# Patient Record
Sex: FEMALE | Race: WHITE | NOT HISPANIC OR LATINO | Employment: OTHER | URBAN - METROPOLITAN AREA
[De-identification: names, ages, dates, MRNs, and addresses within clinical notes are randomized per-mention and may not be internally consistent; named-entity substitution may affect disease eponyms.]

---

## 2019-03-12 ENCOUNTER — HOSPITAL ENCOUNTER (OUTPATIENT)
Dept: OTHER | Facility: HOSPITAL | Age: 39
Discharge: HOME | End: 2019-03-12
Attending: NURSE PRACTITIONER

## 2019-03-12 LAB
APPEARANCE UR: CLEAR
BILIRUB UR QL: NEGATIVE
COLOR UR: YELLOW
CONV COLLECTION SOURCE (UA): NORMAL
CONV UROBILINOGEN IN URINE BY AUTOMATED TEST STRIP: 0.2 {EHRLICHU}/DL (ref 0.1–1)
GLUCOSE UR QL: NEGATIVE MG/DL
HGB UR QL STRIP: NEGATIVE
KETONES UR QL STRIP: NEGATIVE MG/DL
LEUKOCYTE ESTERASE UR QL STRIP: NEGATIVE
NITRITE UR QL STRIP: NEGATIVE
PH UR STRIP.AUTO: 7.5 [PH] (ref 5–8)
PROT UR QL: NEGATIVE MG/DL
SP GR UR: 1.01 (ref 1–1.03)

## 2019-03-14 LAB — BACTERIA UR CULT: NORMAL

## 2019-04-03 ENCOUNTER — HOSPITAL ENCOUNTER (OUTPATIENT)
Dept: NEUROLOGY | Facility: HOSPITAL | Age: 39
Discharge: HOME | End: 2019-04-03
Attending: PSYCHIATRY & NEUROLOGY

## 2019-08-23 ENCOUNTER — HOSPITAL ENCOUNTER (OUTPATIENT)
Dept: GENERAL RADIOLOGY | Facility: HOSPITAL | Age: 39
Discharge: HOME | End: 2019-08-23
Attending: OBSTETRICS & GYNECOLOGY

## 2019-09-06 ENCOUNTER — HOSPITAL ENCOUNTER (OUTPATIENT)
Dept: GENERAL RADIOLOGY | Facility: HOSPITAL | Age: 39
Discharge: HOME | End: 2019-09-06
Attending: OBSTETRICS & GYNECOLOGY

## 2020-02-05 ENCOUNTER — HOSPITAL ENCOUNTER (OUTPATIENT)
Dept: URGENT CARE | Facility: CLINIC | Age: 40
Discharge: HOME | End: 2020-02-05
Attending: NURSE PRACTITIONER

## 2020-02-07 LAB — BACTERIA SPEC AEROBE CULT: NORMAL

## 2020-05-28 ENCOUNTER — HOSPITAL ENCOUNTER (OUTPATIENT)
Dept: OTHER | Facility: HOSPITAL | Age: 40
Discharge: HOME | End: 2020-05-28
Attending: NURSE PRACTITIONER

## 2020-05-28 LAB
APPEARANCE UR: CLEAR
BILIRUB UR QL: NEGATIVE
COLOR UR: ABNORMAL
CONV BACTERIA: ABNORMAL
CONV COLLECTION SOURCE (UA): ABNORMAL
CONV HYALINE CASTS IN URINE MICRO: ABNORMAL /[LPF]
CONV UROBILINOGEN IN URINE BY AUTOMATED TEST STRIP: 0.2 {EHRLICHU}/DL (ref 0.1–1)
GLUCOSE UR QL: NEGATIVE MG/DL
HGB UR QL STRIP: ABNORMAL
KETONES UR QL STRIP: NEGATIVE MG/DL
LEUKOCYTE ESTERASE UR QL STRIP: ABNORMAL
NITRITE UR QL STRIP: NEGATIVE
PH UR STRIP.AUTO: 7.5 [PH] (ref 5–8)
PROT UR QL: 100 MG/DL
RBC #/AREA URNS HPF: ABNORMAL /[HPF]
SP GR UR: 1 (ref 1–1.03)
SQUAMOUS SPT QL MICRO: ABNORMAL /[HPF]
WBC #/AREA URNS HPF: ABNORMAL /[HPF]

## 2020-05-31 LAB
AMOXICILLIN+CLAV SUSC ISLT: 4
AMPICILLIN SUSC ISLT: >=32
AMPICILLIN+SULBAC SUSC ISLT: 4
BACTERIA UR CULT: ABNORMAL
CEFAZOLIN SUSC ISLT: <=4
CEFEPIME SUSC ISLT: <=1
CEFTAZIDIME SUSC ISLT: <=1
CEFTRIAXONE SUSC ISLT: <=1
CEFUROXIME ORAL SUSC ISLT: 4
CEFUROXIME PARENTER SUSC ISLT: 4
CIPROFLOXACIN SUSC ISLT: <=0.25
ERTAPENEM SUSC ISLT: <=0.5
GENTAMICIN SUSC ISLT: <=1
LEVOFLOXACIN SUSC ISLT: <=0.12
NITROFURANTOIN SUSC ISLT: <=16
TETRACYCLINE SUSC ISLT: <=1
TMP SMX SUSC ISLT: >=320
TOBRAMYCIN SUSC ISLT: <=1

## 2020-09-28 ENCOUNTER — HOSPITAL ENCOUNTER (OUTPATIENT)
Dept: OTHER | Facility: HOSPITAL | Age: 40
Discharge: HOME | End: 2020-09-28
Attending: NURSE PRACTITIONER

## 2020-09-28 LAB
APPEARANCE UR: CLEAR
BILIRUB UR QL: NEGATIVE
COLOR UR: YELLOW
CONV COLLECTION SOURCE (UA): NORMAL
CONV UROBILINOGEN IN URINE BY AUTOMATED TEST STRIP: 0.2 {EHRLICHU}/DL (ref 0.1–1)
GLUCOSE UR QL: NEGATIVE MG/DL
HGB UR QL STRIP: NEGATIVE
KETONES UR QL STRIP: NEGATIVE MG/DL
LEUKOCYTE ESTERASE UR QL STRIP: NEGATIVE
NITRITE UR QL STRIP: NEGATIVE
PH UR STRIP.AUTO: 6.5 [PH] (ref 5–8)
PROT UR QL: NEGATIVE MG/DL
SP GR UR: 1.01 (ref 1–1.03)

## 2020-09-30 LAB — BACTERIA UR CULT: NORMAL

## 2020-10-08 ENCOUNTER — HOSPITAL ENCOUNTER (OUTPATIENT)
Dept: PREADMISSION TESTING | Facility: HOSPITAL | Age: 40
Discharge: HOME | End: 2020-10-08
Attending: OBSTETRICS & GYNECOLOGY

## 2020-10-09 LAB — SARS-COV-2 RNA SPEC QL NAA+PROBE: NOT DETECTED

## 2020-10-13 ENCOUNTER — HOSPITAL ENCOUNTER (OUTPATIENT)
Dept: PERIOP | Facility: HOSPITAL | Age: 40
Setting detail: HOSPITAL OUTPATIENT SURGERY
Discharge: HOME | End: 2020-10-14
Attending: OBSTETRICS & GYNECOLOGY

## 2020-10-13 LAB
ABO GROUP BLD: NORMAL
ANION GAP SERPL CALC-SCNC: 14 MMOL/L (ref 8–19)
BASOPHILS # BLD AUTO: 0.06 10*3/UL (ref 0–0.2)
BASOPHILS NFR BLD AUTO: 1.1 % (ref 0–3)
BLD GP AB SCN SERPL QL: NORMAL
BUN SERPL-MCNC: 9 MG/DL (ref 5–25)
BUN/CREAT SERPL: 8 {RATIO} (ref 6–20)
CALCIUM SERPL-MCNC: 9.1 MG/DL (ref 8.7–10.4)
CHLORIDE SERPL-SCNC: 108 MMOL/L (ref 99–111)
CONV ABD CONTROL: NORMAL
CONV ABS IMM GRAN: 0.01 10*3/UL (ref 0–0.2)
CONV CO2: 21 MMOL/L (ref 22–32)
CONV IMMATURE GRAN: 0.2 % (ref 0–1.8)
CREAT UR-MCNC: 1.14 MG/DL (ref 0.5–0.9)
DEPRECATED RDW RBC AUTO: 41.1 FL (ref 36.4–46.3)
EOSINOPHIL # BLD AUTO: 0.14 10*3/UL (ref 0–0.7)
EOSINOPHIL # BLD AUTO: 2.6 % (ref 0–7)
ERYTHROCYTE [DISTWIDTH] IN BLOOD BY AUTOMATED COUNT: 12.1 % (ref 11.7–14.4)
GFR SERPLBLD BASED ON 1.73 SQ M-ARVRAT: 59 ML/MIN/{1.73_M2}
GLUCOSE SERPL-MCNC: 81 MG/DL (ref 65–99)
HCG UR QL: NEGATIVE
HCT VFR BLD AUTO: 33.3 % (ref 37–47)
HCT VFR BLD AUTO: 35.7 % (ref 37–47)
HGB BLD-MCNC: 11.4 G/DL (ref 12–16)
HGB BLD-MCNC: 12 G/DL (ref 12–16)
LYMPHOCYTES # BLD AUTO: 2.29 10*3/UL (ref 1–5)
LYMPHOCYTES NFR BLD AUTO: 41.7 % (ref 20–45)
MCH RBC QN AUTO: 31.3 PG (ref 27–31)
MCHC RBC AUTO-ENTMCNC: 33.6 G/DL (ref 33–37)
MCV RBC AUTO: 93 FL (ref 81–99)
MONOCYTES # BLD AUTO: 0.38 10*3/UL (ref 0.2–1.2)
MONOCYTES NFR BLD AUTO: 6.9 % (ref 3–10)
NEUTROPHILS # BLD AUTO: 2.61 10*3/UL (ref 2–8)
NEUTROPHILS NFR BLD AUTO: 47.5 % (ref 30–85)
NRBC CBCN: 0 % (ref 0–0.7)
OSMOLALITY SERPL CALC.SUM OF ELEC: 286 MOSM/KG (ref 273–304)
PLATELET # BLD AUTO: 200 10*3/UL (ref 130–400)
PMV BLD AUTO: 9.2 FL (ref 9.4–12.3)
POTASSIUM SERPL-SCNC: 4 MMOL/L (ref 3.5–5.3)
RBC # BLD AUTO: 3.84 10*6/UL (ref 4.2–5.4)
RH BLD: NORMAL
SODIUM SERPL-SCNC: 139 MMOL/L (ref 135–147)
WBC # BLD AUTO: 5.49 10*3/UL (ref 4.8–10.8)

## 2020-10-14 LAB
ANION GAP SERPL CALC-SCNC: 13 MMOL/L (ref 8–19)
BASOPHILS # BLD AUTO: 0.03 10*3/UL (ref 0–0.2)
BASOPHILS NFR BLD AUTO: 0.3 % (ref 0–3)
BUN SERPL-MCNC: 6 MG/DL (ref 5–25)
BUN/CREAT SERPL: 7 {RATIO} (ref 6–20)
CALCIUM SERPL-MCNC: 7.9 MG/DL (ref 8.7–10.4)
CHLORIDE SERPL-SCNC: 108 MMOL/L (ref 99–111)
CONV ABS IMM GRAN: 0.03 10*3/UL (ref 0–0.2)
CONV CO2: 20 MMOL/L (ref 22–32)
CONV IMMATURE GRAN: 0.3 % (ref 0–1.8)
CREAT UR-MCNC: 0.83 MG/DL (ref 0.5–0.9)
DEPRECATED RDW RBC AUTO: 38.8 FL (ref 36.4–46.3)
EOSINOPHIL # BLD AUTO: 0.02 10*3/UL (ref 0–0.7)
EOSINOPHIL # BLD AUTO: 0.2 % (ref 0–7)
ERYTHROCYTE [DISTWIDTH] IN BLOOD BY AUTOMATED COUNT: 11.8 % (ref 11.7–14.4)
GFR SERPLBLD BASED ON 1.73 SQ M-ARVRAT: >60 ML/MIN/{1.73_M2}
GLUCOSE SERPL-MCNC: 118 MG/DL (ref 65–99)
HCT VFR BLD AUTO: 29.7 % (ref 37–47)
HGB BLD-MCNC: 10.3 G/DL (ref 12–16)
LYMPHOCYTES # BLD AUTO: 2.17 10*3/UL (ref 1–5)
LYMPHOCYTES NFR BLD AUTO: 21.5 % (ref 20–45)
MCH RBC QN AUTO: 31.5 PG (ref 27–31)
MCHC RBC AUTO-ENTMCNC: 34.7 G/DL (ref 33–37)
MCV RBC AUTO: 90.8 FL (ref 81–99)
MONOCYTES # BLD AUTO: 0.66 10*3/UL (ref 0.2–1.2)
MONOCYTES NFR BLD AUTO: 6.5 % (ref 3–10)
NEUTROPHILS # BLD AUTO: 7.18 10*3/UL (ref 2–8)
NEUTROPHILS NFR BLD AUTO: 71.2 % (ref 30–85)
NRBC CBCN: 0 % (ref 0–0.7)
OSMOLALITY SERPL CALC.SUM OF ELEC: 283 MOSM/KG (ref 273–304)
PLATELET # BLD AUTO: 210 10*3/UL (ref 130–400)
PMV BLD AUTO: 9.4 FL (ref 9.4–12.3)
POTASSIUM SERPL-SCNC: 3.9 MMOL/L (ref 3.5–5.3)
RBC # BLD AUTO: 3.27 10*6/UL (ref 4.2–5.4)
SODIUM SERPL-SCNC: 137 MMOL/L (ref 135–147)
WBC # BLD AUTO: 10.09 10*3/UL (ref 4.8–10.8)

## 2020-12-03 ENCOUNTER — HOSPITAL ENCOUNTER (OUTPATIENT)
Dept: GENERAL RADIOLOGY | Facility: HOSPITAL | Age: 40
Discharge: HOME | End: 2020-12-03
Attending: OBSTETRICS & GYNECOLOGY

## 2021-01-11 ENCOUNTER — HOSPITAL ENCOUNTER (OUTPATIENT)
Dept: OTHER | Facility: HOSPITAL | Age: 41
Discharge: HOME | End: 2021-01-11
Attending: NURSE PRACTITIONER

## 2021-01-11 LAB
APPEARANCE UR: CLEAR
BILIRUB UR QL: NEGATIVE
COLOR UR: YELLOW
CONV COLLECTION SOURCE (UA): NORMAL
CONV UROBILINOGEN IN URINE BY AUTOMATED TEST STRIP: 0.2 {EHRLICHU}/DL (ref 0.1–1)
GLUCOSE UR QL: NEGATIVE MG/DL
HGB UR QL STRIP: NEGATIVE
KETONES UR QL STRIP: NEGATIVE MG/DL
LEUKOCYTE ESTERASE UR QL STRIP: NEGATIVE
NITRITE UR QL STRIP: NEGATIVE
PH UR STRIP.AUTO: 6 [PH] (ref 5–8)
PROT UR QL: NEGATIVE MG/DL
SP GR UR: 1.01 (ref 1–1.03)

## 2021-01-13 LAB — BACTERIA UR CULT: NORMAL

## 2021-02-08 ENCOUNTER — OFFICE VISIT CONVERTED (OUTPATIENT)
Dept: CARDIOLOGY | Facility: CLINIC | Age: 41
End: 2021-02-08
Attending: INTERNAL MEDICINE

## 2021-02-26 ENCOUNTER — HOSPITAL ENCOUNTER (OUTPATIENT)
Dept: CARDIOLOGY | Facility: HOSPITAL | Age: 41
Discharge: HOME | End: 2021-02-26
Attending: INTERNAL MEDICINE

## 2021-05-10 NOTE — H&P
History and Physical      Patient Name: Maddy Garcia   Patient ID: [id number]   Sex: Female   YOB: 1980    Primary Care Provider: Hortensia ESPARZA   Referring Provider: Hortensia ESPARZA    Visit Date: February 8, 2021    Provider: Mat Juarez MD   Location: [facility]   Location Address: [address] [place] [address], [facility] [[email or web address]] A   [place], [place]  [id number]   Location Phone: [phone]          Chief Complaint     Chest pain.       History Of Present Illness  Consult requested by: Hortensia ESPARZA   Maddy Garcia is a 41 year old /White female with hypertension and ongoing anxiety issues who has had longstanding chest pain issues but recently worse in the last few weeks. This occurred after an argument with her , which was quite intense, associated with a sharp substernal stabbing discomfort that was associated with shortness of breath. It caused her to take some aspirin and was severe in nature. The patient reports a syncopal episode that also occurred about a year ago. She suffers from problems with anxiety and attributes a lot of that to being exacerbated by stressful events regarding her  and she reports that he is quite difficult to deal with and can be detrimental to her health in general.   PAST MEDICAL HISTORY: Hypertension; Anxiety; Posttraumatic stress disorder; Migraine headaches; Previous suicide attempts.   FAMILY MEDICAL HISTORY: Significant for brother that had MI at 40. Dad had COPD and hypertension.   PSYCHOSOCIAL HISTORY: She does not smoke but does currently vape. She does not use alcohol. Caffeine intake dailiy.   CURRENT MEDICATIONS: Carvedilol 12.5 mg b.i.d.; quetiapine 50 mg q.h.s.; lamotrigine 100 mg b.i.d.; amitriptyline 100 mg daily; prazosin 1 mg q.h.s.; Vyvanse 50 mg q.a.m.; lorazepam 2 mg t.i.d.; spironolactone 100 mg daily; buspirone 15 mg t.i.d.; chlorpromazine 500 mg 1/2 tab q.h.s.; desvenlafaxine ER 25 mg q.a.m.; Trokendi  "50 mg three tabs q.h.s.; oxycodone/acetaminophen 7.5/325 mg p.r.n.; Motrin 800 mg p.r.n.; butalbital/acetaminophen/caffeine p.r.n.; sumatriptan succinate 100 mg p.r.n.; stool softener. The dosage and frequency of the medications were reviewed with the patient.   ALLERGIES: SULFA DRUGS.       Review of Systems  · Constitutional  o Admits  o : fatigue, recent weight changes   o Denies  o : good general health lately  · Eyes  o Admits  o : blurred or double vision  · HENT  o Denies  o : hearing loss or ringing, chronic sinus problem, swollen glands in neck  · Cardiovascular  o Admits  o : chest pain, palpitations (fast, fluttering, or skipping beats), swelling (feet, ankles, hands), shortness of breath while walking or lying flat  · Respiratory  o Admits  o : asthma or wheezing  o Denies  o : COPD  · Gastrointestinal  o Denies  o : ulcers, nausea or vomiting  · Neurologic  o Admits  o : lightheaded or dizzy, headaches  o Denies  o : stroke  · Musculoskeletal  o Admits  o : joint pain, back pain  · Endocrine  o Admits  o : heat or cold intolerance, excessive thirst or urination  o Denies  o : thyroid disease, diabetes  · Heme-Lymph  o Denies  o : bleeding or bruising tendency, anemia      Vitals  Date Time BP Position Site L\R Cuff Size HR RR TEMP (F) WT  HT  BMI kg/m2 BSA m2 O2 Sat FR L/min FiO2        02/08/2021 11:37 /70 Sitting    76 - R   132lbs 0oz 5'  3\" 23.38 1.63             Physical Examination  · Constitutional  o Appearance  o : Awake, alert, in no acute distress.   · Head and Face  o HEENT  o : PERRLA.  · Eyes  o Conjunctivae  o : Normal.  · Ears, Nose, Mouth and Throat  o Oral Cavity  o :   § Oral Mucosa  § : Normal.  · Neck  o Inspection/Palpation  o : No JVD.  · Respiratory  o Respiratory  o : Chest is symmetrical. Lung fields are clear. No rhonchi or wheezes heard.  · Cardiovascular  o Heart  o :   § Auscultation of Heart  § : S1, S2 are normal. Regular rate and rhythm without murmurs, gallops, " or rubs.  o Peripheral Vascular System  o :   § Extremities  § : Nails normal. No clubbing or cyanosis. Femoral pulses adequate. Pedal pulses adequate. No peripheral edema.  · Gastrointestinal  o Abdominal Examination  o : Abdomen soft. No masses. No guarding or rigidity. No hepatosplenomegaly. Bowel sounds normal.  · Musculoskeletal  o General  o :   § General Musculoskeletal  § : Muscle tone and strength were normal.  · Skin and Subcutaneous Tissue  o General Inspection  o : Unremarkable.  · Labs  o Labs  o : Hemoglobin 12.5. Creatinine 1.0. LDL cholesterol 97.     EKG: Performed in the office today.   Indications: Chest pain.  Results: Normal sinus rhythm, borderline low voltage.  Comparison: No change from prior EKGs.    The patient had an exercise stress echocardiogram done in October 2019.  She walked for 10 minutes and stopped secondary to fatigue.  She had no clinical EKG or imaging evidence of ischemia.  At that time her ejection fraction was normal.           Assessment     ASSESSMENT & PLAN:    1.  Chest pain, associated with emotionally stressful situations.  Suspect likely just anxiety related.  The patient        does have some risk factors of hypertension and previous smoking history.  Recommended since the recent        episode was more intense to repeat a stress echocardiogram.  If this is within normal limits then would just        continue to work on the patient's anxiety issues and stressful situations.   2.  Smoking.  The patient is currently vaping.  Did discuss with her the importance of total cessation as the        long-term health outcomes of vaping are not known but certainly may be deleterious to her health in the        future.   3.  Anxiety.  Patient with previous anxiety and PTSD.  It was discussed with the patient the safety of her living        situation and she was counseled on, if needed, assistance or a safe place to live.  She denied this at this        time.  Recommended that  she follow back up with her PCP and psychiatrist to further help with her ongoing        anxiety issues.       aMt Juarez MD  JH/rt                Electronically Signed by: Serena Strange-, Other -Author on February 21, 2021 04:52:53 PM  Electronically Co-signed by: Mat Juarez MD -Reviewer on February 26, 2021 10:52:27 AM

## 2021-05-14 VITALS
SYSTOLIC BLOOD PRESSURE: 128 MMHG | HEIGHT: 63 IN | WEIGHT: 132 LBS | BODY MASS INDEX: 23.39 KG/M2 | DIASTOLIC BLOOD PRESSURE: 70 MMHG | HEART RATE: 76 BPM

## 2021-07-12 ENCOUNTER — LAB REQUISITION (OUTPATIENT)
Dept: LAB | Facility: HOSPITAL | Age: 41
End: 2021-07-12

## 2021-07-12 DIAGNOSIS — R10.2 PELVIC AND PERINEAL PAIN: ICD-10-CM

## 2021-07-12 LAB
BACTERIA UR QL AUTO: ABNORMAL /HPF
BILIRUB UR QL STRIP: NEGATIVE
CLARITY UR: CLEAR
COLOR UR: YELLOW
GLUCOSE UR STRIP-MCNC: NEGATIVE MG/DL
HGB UR QL STRIP.AUTO: NEGATIVE
HYALINE CASTS UR QL AUTO: ABNORMAL /LPF
KETONES UR QL STRIP: NEGATIVE
LEUKOCYTE ESTERASE UR QL STRIP.AUTO: ABNORMAL
NITRITE UR QL STRIP: NEGATIVE
PH UR STRIP.AUTO: 8 [PH] (ref 5–8)
PROT UR QL STRIP: NEGATIVE
RBC # UR: ABNORMAL /HPF
REF LAB TEST METHOD: ABNORMAL
SP GR UR STRIP: <=1.005 (ref 1–1.03)
SQUAMOUS #/AREA URNS HPF: ABNORMAL /HPF
UROBILINOGEN UR QL STRIP: ABNORMAL
WBC UR QL AUTO: ABNORMAL /HPF

## 2021-07-12 PROCEDURE — 81001 URINALYSIS AUTO W/SCOPE: CPT | Performed by: NURSE PRACTITIONER

## 2021-07-12 PROCEDURE — 87186 SC STD MICRODIL/AGAR DIL: CPT | Performed by: NURSE PRACTITIONER

## 2021-07-12 PROCEDURE — 87077 CULTURE AEROBIC IDENTIFY: CPT | Performed by: NURSE PRACTITIONER

## 2021-07-12 PROCEDURE — 87086 URINE CULTURE/COLONY COUNT: CPT | Performed by: NURSE PRACTITIONER

## 2021-07-14 LAB — BACTERIA SPEC AEROBE CULT: ABNORMAL

## 2021-07-28 LAB
BILIRUB UR QL STRIP: NEGATIVE
CLARITY UR: CLEAR
COLOR UR: YELLOW
GLUCOSE UR STRIP-MCNC: NEGATIVE MG/DL
HGB UR QL STRIP.AUTO: NEGATIVE
KETONES UR QL STRIP: NEGATIVE
LEUKOCYTE ESTERASE UR QL STRIP.AUTO: NEGATIVE
NITRITE UR QL STRIP: NEGATIVE
PH UR STRIP.AUTO: 7.5 [PH] (ref 5–8)
PROT UR QL STRIP: NEGATIVE
SP GR UR STRIP: <=1.005 (ref 1–1.03)
UROBILINOGEN UR QL STRIP: NORMAL

## 2021-07-28 PROCEDURE — 96374 THER/PROPH/DIAG INJ IV PUSH: CPT

## 2021-07-28 PROCEDURE — 81003 URINALYSIS AUTO W/O SCOPE: CPT | Performed by: EMERGENCY MEDICINE

## 2021-07-28 PROCEDURE — 99283 EMERGENCY DEPT VISIT LOW MDM: CPT

## 2021-07-28 RX ORDER — SODIUM CHLORIDE 0.9 % (FLUSH) 0.9 %
10 SYRINGE (ML) INJECTION AS NEEDED
Status: DISCONTINUED | OUTPATIENT
Start: 2021-07-28 | End: 2021-07-29 | Stop reason: HOSPADM

## 2021-07-29 ENCOUNTER — APPOINTMENT (OUTPATIENT)
Dept: CT IMAGING | Facility: HOSPITAL | Age: 41
End: 2021-07-29

## 2021-07-29 ENCOUNTER — HOSPITAL ENCOUNTER (EMERGENCY)
Facility: HOSPITAL | Age: 41
Discharge: HOME | End: 2021-07-29
Attending: EMERGENCY MEDICINE | Admitting: EMERGENCY MEDICINE

## 2021-07-29 VITALS
WEIGHT: 128.75 LBS | HEART RATE: 62 BPM | OXYGEN SATURATION: 100 % | BODY MASS INDEX: 22.81 KG/M2 | SYSTOLIC BLOOD PRESSURE: 109 MMHG | RESPIRATION RATE: 16 BRPM | TEMPERATURE: 97.5 F | DIASTOLIC BLOOD PRESSURE: 72 MMHG

## 2021-07-29 DIAGNOSIS — R10.31 RIGHT LOWER QUADRANT ABDOMINAL PAIN: Primary | ICD-10-CM

## 2021-07-29 DIAGNOSIS — R10.9 RIGHT FLANK PAIN: ICD-10-CM

## 2021-07-29 LAB
ALBUMIN SERPL-MCNC: 4.5 G/DL (ref 3.5–5.2)
ALBUMIN/GLOB SERPL: 2 G/DL
ALP SERPL-CCNC: 46 U/L (ref 39–117)
ALT SERPL W P-5'-P-CCNC: 13 U/L (ref 1–33)
ANION GAP SERPL CALCULATED.3IONS-SCNC: 9.4 MMOL/L (ref 5–15)
AST SERPL-CCNC: 16 U/L (ref 1–32)
BASOPHILS # BLD AUTO: 0.04 10*3/MM3 (ref 0–0.2)
BASOPHILS NFR BLD AUTO: 0.6 % (ref 0–1.5)
BILIRUB SERPL-MCNC: 0.2 MG/DL (ref 0–1.2)
BUN SERPL-MCNC: 7 MG/DL (ref 6–20)
BUN/CREAT SERPL: 7.5 (ref 7–25)
CALCIUM SPEC-SCNC: 8.9 MG/DL (ref 8.6–10.5)
CHLORIDE SERPL-SCNC: 108 MMOL/L (ref 98–107)
CO2 SERPL-SCNC: 21.6 MMOL/L (ref 22–29)
CREAT SERPL-MCNC: 0.93 MG/DL (ref 0.57–1)
DEPRECATED RDW RBC AUTO: 40.3 FL (ref 37–54)
EOSINOPHIL # BLD AUTO: 0.11 10*3/MM3 (ref 0–0.4)
EOSINOPHIL NFR BLD AUTO: 1.7 % (ref 0.3–6.2)
ERYTHROCYTE [DISTWIDTH] IN BLOOD BY AUTOMATED COUNT: 12 % (ref 12.3–15.4)
GFR SERPL CREATININE-BSD FRML MDRD: 66 ML/MIN/1.73
GLOBULIN UR ELPH-MCNC: 2.3 GM/DL
GLUCOSE SERPL-MCNC: 82 MG/DL (ref 65–99)
HCT VFR BLD AUTO: 36.7 % (ref 34–46.6)
HGB BLD-MCNC: 12.6 G/DL (ref 12–15.9)
HOLD SPECIMEN: NORMAL
HOLD SPECIMEN: NORMAL
IMM GRANULOCYTES # BLD AUTO: 0.02 10*3/MM3 (ref 0–0.05)
IMM GRANULOCYTES NFR BLD AUTO: 0.3 % (ref 0–0.5)
LIPASE SERPL-CCNC: 33 U/L (ref 13–60)
LYMPHOCYTES # BLD AUTO: 2.54 10*3/MM3 (ref 0.7–3.1)
LYMPHOCYTES NFR BLD AUTO: 39.3 % (ref 19.6–45.3)
MCH RBC QN AUTO: 31.5 PG (ref 26.6–33)
MCHC RBC AUTO-ENTMCNC: 34.3 G/DL (ref 31.5–35.7)
MCV RBC AUTO: 91.8 FL (ref 79–97)
MONOCYTES # BLD AUTO: 0.4 10*3/MM3 (ref 0.1–0.9)
MONOCYTES NFR BLD AUTO: 6.2 % (ref 5–12)
NEUTROPHILS NFR BLD AUTO: 3.36 10*3/MM3 (ref 1.7–7)
NEUTROPHILS NFR BLD AUTO: 51.9 % (ref 42.7–76)
NRBC BLD AUTO-RTO: 0 /100 WBC (ref 0–0.2)
PLATELET # BLD AUTO: 191 10*3/MM3 (ref 140–450)
PMV BLD AUTO: 9 FL (ref 6–12)
POTASSIUM SERPL-SCNC: 3.8 MMOL/L (ref 3.5–5.2)
PROT SERPL-MCNC: 6.8 G/DL (ref 6–8.5)
RBC # BLD AUTO: 4 10*6/MM3 (ref 3.77–5.28)
SODIUM SERPL-SCNC: 139 MMOL/L (ref 136–145)
WBC # BLD AUTO: 6.47 10*3/MM3 (ref 3.4–10.8)
WHOLE BLOOD HOLD SPECIMEN: NORMAL

## 2021-07-29 PROCEDURE — 25010000002 MORPHINE PER 10 MG: Performed by: EMERGENCY MEDICINE

## 2021-07-29 PROCEDURE — 96374 THER/PROPH/DIAG INJ IV PUSH: CPT

## 2021-07-29 PROCEDURE — 0 IOPAMIDOL PER 1 ML: Performed by: EMERGENCY MEDICINE

## 2021-07-29 PROCEDURE — 80053 COMPREHEN METABOLIC PANEL: CPT | Performed by: EMERGENCY MEDICINE

## 2021-07-29 PROCEDURE — 85025 COMPLETE CBC W/AUTO DIFF WBC: CPT | Performed by: EMERGENCY MEDICINE

## 2021-07-29 PROCEDURE — 74177 CT ABD & PELVIS W/CONTRAST: CPT

## 2021-07-29 PROCEDURE — 83690 ASSAY OF LIPASE: CPT | Performed by: EMERGENCY MEDICINE

## 2021-07-29 RX ORDER — QUETIAPINE FUMARATE 50 MG/1
50 TABLET, FILM COATED ORAL
COMMUNITY
Start: 2021-05-30 | End: 2021-08-10 | Stop reason: HOSPADM

## 2021-07-29 RX ORDER — BUSPIRONE HYDROCHLORIDE 15 MG/1
15 TABLET ORAL 3 TIMES DAILY
Status: ON HOLD | COMMUNITY
Start: 2021-07-09 | End: 2021-08-10 | Stop reason: SDUPTHER

## 2021-07-29 RX ORDER — DESVENLAFAXINE SUCCINATE 50 MG/1
50 TABLET, EXTENDED RELEASE ORAL DAILY
COMMUNITY

## 2021-07-29 RX ORDER — LORAZEPAM 2 MG/1
2 TABLET ORAL 3 TIMES DAILY PRN
COMMUNITY
Start: 2021-06-23 | End: 2021-08-10 | Stop reason: HOSPADM

## 2021-07-29 RX ORDER — SODIUM CHLORIDE 0.9 % (FLUSH) 0.9 %
10 SYRINGE (ML) INJECTION AS NEEDED
Status: DISCONTINUED | OUTPATIENT
Start: 2021-07-29 | End: 2021-07-29 | Stop reason: HOSPADM

## 2021-07-29 RX ORDER — CARBAMAZEPINE 200 MG/1
200 TABLET ORAL 2 TIMES DAILY
COMMUNITY

## 2021-07-29 RX ORDER — AMITRIPTYLINE HYDROCHLORIDE 100 MG/1
100 TABLET, FILM COATED ORAL NIGHTLY
COMMUNITY
End: 2021-08-10 | Stop reason: HOSPADM

## 2021-07-29 RX ORDER — DICYCLOMINE HCL 20 MG
20 TABLET ORAL EVERY 6 HOURS
Qty: 20 TABLET | Refills: 0 | Status: SHIPPED | OUTPATIENT
Start: 2021-07-29 | End: 2021-08-10 | Stop reason: HOSPADM

## 2021-07-29 RX ORDER — CHLORZOXAZONE 500 MG/1
750 TABLET ORAL NIGHTLY
COMMUNITY

## 2021-07-29 RX ORDER — CARVEDILOL 25 MG/1
25 TABLET ORAL 2 TIMES DAILY WITH MEALS
COMMUNITY
Start: 2021-06-29

## 2021-07-29 RX ORDER — PRAZOSIN HYDROCHLORIDE 1 MG/1
1 CAPSULE ORAL NIGHTLY
COMMUNITY

## 2021-07-29 RX ORDER — ONDANSETRON 4 MG/1
4 TABLET, ORALLY DISINTEGRATING ORAL EVERY 8 HOURS PRN
Qty: 10 TABLET | Refills: 0 | Status: SHIPPED | OUTPATIENT
Start: 2021-07-29 | End: 2021-08-10 | Stop reason: HOSPADM

## 2021-07-29 RX ORDER — SPIRONOLACTONE 100 MG/1
100 TABLET, FILM COATED ORAL DAILY
COMMUNITY
Start: 2021-06-30 | End: 2021-08-04

## 2021-07-29 RX ORDER — LAMOTRIGINE 100 MG/1
100 TABLET ORAL 2 TIMES DAILY
COMMUNITY
Start: 2021-07-11

## 2021-07-29 RX ORDER — TOPIRAMATE 50 MG/1
3 CAPSULE, EXTENDED RELEASE ORAL DAILY
COMMUNITY
Start: 2021-07-05 | End: 2021-08-10 | Stop reason: HOSPADM

## 2021-07-29 RX ORDER — OXYCODONE AND ACETAMINOPHEN 7.5; 325 MG/1; MG/1
1 TABLET ORAL EVERY 8 HOURS PRN
COMMUNITY
Start: 2021-06-23

## 2021-07-29 RX ORDER — CEFDINIR 300 MG/1
CAPSULE ORAL
COMMUNITY
End: 2021-08-04

## 2021-07-29 RX ADMIN — MORPHINE SULFATE 4 MG: 4 INJECTION, SOLUTION INTRAMUSCULAR; INTRAVENOUS at 02:02

## 2021-07-29 RX ADMIN — IOPAMIDOL 100 ML: 755 INJECTION, SOLUTION INTRAVENOUS at 02:29

## 2021-08-04 ENCOUNTER — APPOINTMENT (OUTPATIENT)
Dept: GENERAL RADIOLOGY | Facility: HOSPITAL | Age: 41
End: 2021-08-04

## 2021-08-04 ENCOUNTER — HOSPITAL ENCOUNTER (INPATIENT)
Facility: HOSPITAL | Age: 41
LOS: 6 days | Discharge: HOME | End: 2021-08-10
Attending: EMERGENCY MEDICINE | Admitting: INTERNAL MEDICINE

## 2021-08-04 DIAGNOSIS — R40.0 SOMNOLENCE: ICD-10-CM

## 2021-08-04 DIAGNOSIS — F43.12 CHRONIC POST-TRAUMATIC STRESS DISORDER (PTSD): ICD-10-CM

## 2021-08-04 DIAGNOSIS — T50.902A INTENTIONAL OVERDOSE OF DRUG IN TABLET FORM (HCC): Primary | ICD-10-CM

## 2021-08-04 DIAGNOSIS — R13.12 OROPHARYNGEAL DYSPHAGIA: ICD-10-CM

## 2021-08-04 DIAGNOSIS — Z78.9 DECREASED ACTIVITIES OF DAILY LIVING (ADL): ICD-10-CM

## 2021-08-04 DIAGNOSIS — F19.10 SUBSTANCE ABUSE (HCC): ICD-10-CM

## 2021-08-04 LAB
ALBUMIN SERPL-MCNC: 3.9 G/DL (ref 3.5–5.2)
ALBUMIN/GLOB SERPL: 2 G/DL
ALP SERPL-CCNC: 42 U/L (ref 39–117)
ALT SERPL W P-5'-P-CCNC: 13 U/L (ref 1–33)
AMPHET+METHAMPHET UR QL: POSITIVE
ANION GAP SERPL CALCULATED.3IONS-SCNC: 7 MMOL/L (ref 5–15)
APAP SERPL-MCNC: 5.2 MCG/ML (ref 0–30)
AST SERPL-CCNC: 18 U/L (ref 1–32)
BARBITURATES UR QL SCN: NEGATIVE
BASOPHILS # BLD AUTO: 0.03 10*3/MM3 (ref 0–0.2)
BASOPHILS NFR BLD AUTO: 0.7 % (ref 0–1.5)
BENZODIAZ UR QL SCN: POSITIVE
BILIRUB SERPL-MCNC: 0.3 MG/DL (ref 0–1.2)
BUN SERPL-MCNC: 10 MG/DL (ref 6–20)
BUN/CREAT SERPL: 9.3 (ref 7–25)
CALCIUM SPEC-SCNC: 8.4 MG/DL (ref 8.6–10.5)
CANNABINOIDS SERPL QL: POSITIVE
CHLORIDE SERPL-SCNC: 105 MMOL/L (ref 98–107)
CO2 SERPL-SCNC: 23 MMOL/L (ref 22–29)
COCAINE UR QL: NEGATIVE
CREAT SERPL-MCNC: 1.07 MG/DL (ref 0.57–1)
DEPRECATED RDW RBC AUTO: 39.7 FL (ref 37–54)
EOSINOPHIL # BLD AUTO: 0.1 10*3/MM3 (ref 0–0.4)
EOSINOPHIL NFR BLD AUTO: 2.2 % (ref 0.3–6.2)
ERYTHROCYTE [DISTWIDTH] IN BLOOD BY AUTOMATED COUNT: 11.9 % (ref 12.3–15.4)
ETHANOL BLD-MCNC: <10 MG/DL (ref 0–10)
ETHANOL UR QL: <0.01 %
GFR SERPL CREATININE-BSD FRML MDRD: 56 ML/MIN/1.73
GLOBULIN UR ELPH-MCNC: 2 GM/DL
GLUCOSE SERPL-MCNC: 90 MG/DL (ref 65–99)
HCT VFR BLD AUTO: 35.9 % (ref 34–46.6)
HGB BLD-MCNC: 12.4 G/DL (ref 12–15.9)
HOLD SPECIMEN: NORMAL
HOLD SPECIMEN: NORMAL
IMM GRANULOCYTES # BLD AUTO: 0.01 10*3/MM3 (ref 0–0.05)
IMM GRANULOCYTES NFR BLD AUTO: 0.2 % (ref 0–0.5)
LYMPHOCYTES # BLD AUTO: 2.19 10*3/MM3 (ref 0.7–3.1)
LYMPHOCYTES NFR BLD AUTO: 48.6 % (ref 19.6–45.3)
MCH RBC QN AUTO: 31.7 PG (ref 26.6–33)
MCHC RBC AUTO-ENTMCNC: 34.5 G/DL (ref 31.5–35.7)
MCV RBC AUTO: 91.8 FL (ref 79–97)
METHADONE UR QL SCN: NEGATIVE
MONOCYTES # BLD AUTO: 0.32 10*3/MM3 (ref 0.1–0.9)
MONOCYTES NFR BLD AUTO: 7.1 % (ref 5–12)
NEUTROPHILS NFR BLD AUTO: 1.86 10*3/MM3 (ref 1.7–7)
NEUTROPHILS NFR BLD AUTO: 41.2 % (ref 42.7–76)
NRBC BLD AUTO-RTO: 0 /100 WBC (ref 0–0.2)
OPIATES UR QL: NEGATIVE
OXYCODONE UR QL SCN: POSITIVE
PLATELET # BLD AUTO: 187 10*3/MM3 (ref 140–450)
PMV BLD AUTO: 8.9 FL (ref 6–12)
POTASSIUM SERPL-SCNC: 3.4 MMOL/L (ref 3.5–5.2)
PROT SERPL-MCNC: 5.9 G/DL (ref 6–8.5)
RBC # BLD AUTO: 3.91 10*6/MM3 (ref 3.77–5.28)
SALICYLATES SERPL-MCNC: 0.4 MG/DL
SODIUM SERPL-SCNC: 135 MMOL/L (ref 136–145)
WBC # BLD AUTO: 4.51 10*3/MM3 (ref 3.4–10.8)
WHOLE BLOOD HOLD SPECIMEN: NORMAL

## 2021-08-04 PROCEDURE — 87205 SMEAR GRAM STAIN: CPT | Performed by: EMERGENCY MEDICINE

## 2021-08-04 PROCEDURE — 99291 CRITICAL CARE FIRST HOUR: CPT

## 2021-08-04 PROCEDURE — 93005 ELECTROCARDIOGRAM TRACING: CPT | Performed by: INTERNAL MEDICINE

## 2021-08-04 PROCEDURE — 80179 DRUG ASSAY SALICYLATE: CPT | Performed by: EMERGENCY MEDICINE

## 2021-08-04 PROCEDURE — 5A1945Z RESPIRATORY VENTILATION, 24-96 CONSECUTIVE HOURS: ICD-10-PCS | Performed by: EMERGENCY MEDICINE

## 2021-08-04 PROCEDURE — 80307 DRUG TEST PRSMV CHEM ANLYZR: CPT | Performed by: EMERGENCY MEDICINE

## 2021-08-04 PROCEDURE — 71045 X-RAY EXAM CHEST 1 VIEW: CPT

## 2021-08-04 PROCEDURE — 99291 CRITICAL CARE FIRST HOUR: CPT | Performed by: INTERNAL MEDICINE

## 2021-08-04 PROCEDURE — 0BH17EZ INSERTION OF ENDOTRACHEAL AIRWAY INTO TRACHEA, VIA NATURAL OR ARTIFICIAL OPENING: ICD-10-PCS | Performed by: EMERGENCY MEDICINE

## 2021-08-04 PROCEDURE — 94799 UNLISTED PULMONARY SVC/PX: CPT

## 2021-08-04 PROCEDURE — 80143 DRUG ASSAY ACETAMINOPHEN: CPT | Performed by: EMERGENCY MEDICINE

## 2021-08-04 PROCEDURE — 93005 ELECTROCARDIOGRAM TRACING: CPT | Performed by: EMERGENCY MEDICINE

## 2021-08-04 PROCEDURE — 82077 ASSAY SPEC XCP UR&BREATH IA: CPT | Performed by: EMERGENCY MEDICINE

## 2021-08-04 PROCEDURE — 31500 INSERT EMERGENCY AIRWAY: CPT

## 2021-08-04 PROCEDURE — 85025 COMPLETE CBC W/AUTO DIFF WBC: CPT | Performed by: EMERGENCY MEDICINE

## 2021-08-04 PROCEDURE — 80053 COMPREHEN METABOLIC PANEL: CPT | Performed by: EMERGENCY MEDICINE

## 2021-08-04 PROCEDURE — 87070 CULTURE OTHR SPECIMN AEROBIC: CPT | Performed by: EMERGENCY MEDICINE

## 2021-08-04 PROCEDURE — 25010000003 POTASSIUM CHLORIDE 10 MEQ/100ML SOLUTION: Performed by: INTERNAL MEDICINE

## 2021-08-04 PROCEDURE — 94002 VENT MGMT INPAT INIT DAY: CPT

## 2021-08-04 RX ORDER — ROCURONIUM BROMIDE 10 MG/ML
INJECTION, SOLUTION INTRAVENOUS
Status: COMPLETED | OUTPATIENT
Start: 2021-08-04 | End: 2021-08-04

## 2021-08-04 RX ORDER — ONDANSETRON 2 MG/ML
4 INJECTION INTRAMUSCULAR; INTRAVENOUS EVERY 6 HOURS PRN
Status: DISCONTINUED | OUTPATIENT
Start: 2021-08-04 | End: 2021-08-10 | Stop reason: HOSPADM

## 2021-08-04 RX ORDER — POTASSIUM CHLORIDE 7.45 MG/ML
10 INJECTION INTRAVENOUS
Status: DISPENSED | OUTPATIENT
Start: 2021-08-04 | End: 2021-08-04

## 2021-08-04 RX ORDER — ETOMIDATE 2 MG/ML
INJECTION INTRAVENOUS
Status: COMPLETED | OUTPATIENT
Start: 2021-08-04 | End: 2021-08-04

## 2021-08-04 RX ORDER — NOREPINEPHRINE BIT/0.9 % NACL 8 MG/250ML
.02-.3 INFUSION BOTTLE (ML) INTRAVENOUS
Status: DISCONTINUED | OUTPATIENT
Start: 2021-08-05 | End: 2021-08-08

## 2021-08-04 RX ORDER — BISACODYL 10 MG
10 SUPPOSITORY, RECTAL RECTAL DAILY PRN
Status: DISCONTINUED | OUTPATIENT
Start: 2021-08-04 | End: 2021-08-10 | Stop reason: HOSPADM

## 2021-08-04 RX ORDER — MULTIPLE VITAMINS W/ MINERALS TAB 9MG-400MCG
1 TAB ORAL DAILY
Status: DISCONTINUED | OUTPATIENT
Start: 2021-08-05 | End: 2021-08-10 | Stop reason: HOSPADM

## 2021-08-04 RX ORDER — SODIUM CHLORIDE 0.9 % (FLUSH) 0.9 %
10 SYRINGE (ML) INJECTION AS NEEDED
Status: DISCONTINUED | OUTPATIENT
Start: 2021-08-04 | End: 2021-08-10 | Stop reason: HOSPADM

## 2021-08-04 RX ORDER — SODIUM CHLORIDE 9 MG/ML
INJECTION, SOLUTION INTRAVENOUS
Status: COMPLETED
Start: 2021-08-04 | End: 2021-08-04

## 2021-08-04 RX ORDER — FAMOTIDINE 20 MG/1
40 TABLET, FILM COATED ORAL DAILY
Status: DISCONTINUED | OUTPATIENT
Start: 2021-08-05 | End: 2021-08-10 | Stop reason: HOSPADM

## 2021-08-04 RX ORDER — NALOXONE HYDROCHLORIDE 1 MG/ML
INJECTION INTRAMUSCULAR; INTRAVENOUS; SUBCUTANEOUS
Status: COMPLETED | OUTPATIENT
Start: 2021-08-04 | End: 2021-08-04

## 2021-08-04 RX ORDER — NOREPINEPHRINE BIT/0.9 % NACL 8 MG/250ML
INFUSION BOTTLE (ML) INTRAVENOUS
Status: DISPENSED
Start: 2021-08-04 | End: 2021-08-05

## 2021-08-04 RX ORDER — POLYETHYLENE GLYCOL 3350 17 G/17G
17 POWDER, FOR SOLUTION ORAL DAILY PRN
Status: DISCONTINUED | OUTPATIENT
Start: 2021-08-04 | End: 2021-08-10 | Stop reason: HOSPADM

## 2021-08-04 RX ORDER — NALOXONE HYDROCHLORIDE 1 MG/ML
INJECTION INTRAMUSCULAR; INTRAVENOUS; SUBCUTANEOUS
Status: COMPLETED
Start: 2021-08-04 | End: 2021-08-04

## 2021-08-04 RX ORDER — NALOXONE HYDROCHLORIDE 1 MG/ML
2 INJECTION INTRAMUSCULAR; INTRAVENOUS; SUBCUTANEOUS ONCE
Status: COMPLETED | OUTPATIENT
Start: 2021-08-04 | End: 2021-08-04

## 2021-08-04 RX ORDER — BISACODYL 5 MG/1
5 TABLET, DELAYED RELEASE ORAL DAILY PRN
Status: DISCONTINUED | OUTPATIENT
Start: 2021-08-04 | End: 2021-08-10 | Stop reason: HOSPADM

## 2021-08-04 RX ORDER — CHOLECALCIFEROL (VITAMIN D3) 125 MCG
5 CAPSULE ORAL NIGHTLY PRN
Status: DISCONTINUED | OUTPATIENT
Start: 2021-08-04 | End: 2021-08-10 | Stop reason: HOSPADM

## 2021-08-04 RX ORDER — AMOXICILLIN 250 MG
2 CAPSULE ORAL 2 TIMES DAILY
Status: DISCONTINUED | OUTPATIENT
Start: 2021-08-04 | End: 2021-08-10 | Stop reason: HOSPADM

## 2021-08-04 RX ORDER — SODIUM CHLORIDE 0.9 % (FLUSH) 0.9 %
10 SYRINGE (ML) INJECTION EVERY 12 HOURS SCHEDULED
Status: DISCONTINUED | OUTPATIENT
Start: 2021-08-04 | End: 2021-08-10 | Stop reason: HOSPADM

## 2021-08-04 RX ORDER — PROPOFOL 10 MG/ML
VIAL (ML) INTRAVENOUS
Status: DISPENSED
Start: 2021-08-04 | End: 2021-08-05

## 2021-08-04 RX ORDER — SODIUM CHLORIDE 0.9 % (FLUSH) 0.9 %
10 SYRINGE (ML) INJECTION AS NEEDED
Status: DISCONTINUED | OUTPATIENT
Start: 2021-08-04 | End: 2021-08-04

## 2021-08-04 RX ADMIN — POTASSIUM CHLORIDE 10 MEQ: 7.46 INJECTION, SOLUTION INTRAVENOUS at 22:06

## 2021-08-04 RX ADMIN — NALOXONE HYDROCHLORIDE 2 MG: 1 INJECTION PARENTERAL at 16:05

## 2021-08-04 RX ADMIN — SODIUM CHLORIDE 1000 ML: 9 INJECTION, SOLUTION INTRAVENOUS at 16:12

## 2021-08-04 RX ADMIN — POTASSIUM CHLORIDE 10 MEQ: 7.46 INJECTION, SOLUTION INTRAVENOUS at 19:37

## 2021-08-04 RX ADMIN — ROCURONIUM BROMIDE 75 MG: 10 INJECTION INTRAVENOUS at 16:43

## 2021-08-04 RX ADMIN — SODIUM CHLORIDE 1000 ML: 9 INJECTION, SOLUTION INTRAVENOUS at 16:30

## 2021-08-04 RX ADMIN — SODIUM CHLORIDE, PRESERVATIVE FREE 10 ML: 5 INJECTION INTRAVENOUS at 22:07

## 2021-08-04 RX ADMIN — POTASSIUM CHLORIDE 10 MEQ: 7.46 INJECTION, SOLUTION INTRAVENOUS at 23:34

## 2021-08-04 RX ADMIN — NALOXONE HYDROCHLORIDE 2 MG: 1 INJECTION INTRAMUSCULAR; INTRAVENOUS; SUBCUTANEOUS at 16:15

## 2021-08-04 RX ADMIN — ETOMIDATE 10 MG: 40 INJECTION, SOLUTION INTRAVENOUS at 16:43

## 2021-08-04 RX ADMIN — NALOXONE HYDROCHLORIDE 2 MG: 1 INJECTION PARENTERAL at 16:15

## 2021-08-04 NOTE — ED PROVIDER NOTES
Time: 4:04 PM EDT  Arrived by: Ambulance  Chief Complaint:   Chief Complaint   Patient presents with   • Ingestion     Unknown overdose. Family disposed of what pt took before EMS arrived. Pt is minimally responsive     History provided by: EMS  History is limited by: AMS    History of Present Illness:      Maddy Garcia is a 41 y.o. female who presents to the emergency department today with complaints of possible ingestion. EMS reports that empty Buspar (15mg) and Seroquel (50mg) bottles were discovered. Zofran and Vyvance bottles were also discovered. They note that the patient was minimally responsive at the scene. She was given Narcan prior to arrival.    Per her records, the patient has a history of anxiety, mood disorder, and GERD. She is a former smoker and does drink rarely. There are no other acute complaints at this time.      History provided by:  EMS personnel  History limited by:  Mental status change   used: No    Ingestion  Ingested substance:  Prescription medication  Time since incident: Unknown.  Ingestion amount:  Unknown amount.  Incident location:  Home  Context: substance was missing        Similar Symptoms Previously: Unknown.  Recently seen: Patient was last seen in the ED on 2021 with abdominal pain.      Patient Care Team  Primary Care Provider: Jaileen Mitchell APRN    Past Medical History:     Allergies   Allergen Reactions   • Sulfa Antibiotics Rash     Past Medical History:   Diagnosis Date   • Anxiety    • Arthritis    • Bladder disorder    • Chest pain    • Chronic allergic rhinitis    • GERD (gastroesophageal reflux disease)    • Mood disorder (CMS/[facility])      Past Surgical History:   Procedure Laterality Date   • ABDOMINAL SURGERY     •  SECTION      HAD 3: ,2006,   • HYSTERECTOMY       Family History   Problem Relation Age of Onset   • Diabetes Mother         UNSPECIFIED   • Breast cancer Mother         MOTHER IN 60s   • Diabetes  Maternal Grandmother         UNSPECIFIED   • Diabetes Paternal Grandmother         UNSPECIFIED       Home Medications:  Prior to Admission medications    Medication Sig Start Date End Date Taking? Authorizing Provider   amitriptyline (ELAVIL) 100 MG tablet amitriptyline 100 mg tablet   TAKE 1 TABLET BY MOUTH EVERY NIGHT AT BEDTIME    Librado Marcus MD   busPIRone (BUSPAR) 15 MG tablet  7/9/21   Librado Marcus MD   carBAMazepine (TEGretol) 200 MG tablet carbamazepine 200 mg tablet   TAKE 1 TABLET BY MOUTH TWICE DAILY    Librado Marcus MD   carvedilol (COREG) 25 MG tablet Take 25 mg by mouth 2 (Two) Times a Day. 6/29/21   Librado Marcus MD   cefdinir (OMNICEF) 300 MG capsule cefdinir 300 mg capsule    Librado Marcus MD   chlorzoxazone (PARAFON FORTE) 500 MG tablet chlorzoxazone 500 mg tablet   TAKE 1.5 TABLETS BY MOUTH EVERY NIGHT AT BEDTIME FOOD    Librado Marcus MD   Desvenlafaxine Succinate ER 25 MG tablet sustained-release 24 hour desvenlafaxine succinate ER 25 mg tablet,extended release 24 hr    Librado Marcus MD   dicyclomine (BENTYL) 20 MG tablet Take 1 tablet by mouth Every 6 (Six) Hours. 7/29/21   Parker Aldana APRN   lamoTRIgine (LaMICtal) 100 MG tablet  7/11/21   Librado Marcus MD   lisdexamfetamine (Vyvanse) 50 MG capsule Vyvanse 50 mg capsule   TAKE 1 CAPSULE BY MOUTH EVERY MORNING    Librado Marcus MD   LORazepam (ATIVAN) 2 MG tablet Take 2 mg by mouth 3 (Three) Times a Day As Needed. 6/23/21   Librado Marcus MD   ondansetron ODT (ZOFRAN-ODT) 4 MG disintegrating tablet Place 1 tablet on the tongue Every 8 (Eight) Hours As Needed for Nausea or Vomiting. 7/29/21   Parker Aldana APRN   oxyCODONE-acetaminophen (PERCOCET) 7.5-325 MG per tablet Take 1 tablet by mouth Every 8 (Eight) Hours As Needed. 6/23/21   Librado Marcus MD   prazosin (MINIPRESS) 1 MG capsule prazosin 1 mg capsule   TAKE 1 CAPSULE BY MOUTH EVERY  "NIGHT AT BEDTIME FOR NIGHTMARES    Librado Marcus MD   QUEtiapine (SEROquel) 50 MG tablet Take 50 mg by mouth every night at bedtime. 5/30/21   Librado Marcus MD   spironolactone (ALDACTONE) 100 MG tablet Take 100 mg by mouth Daily. 6/30/21   Librado Marcus MD   Trokendi XR 50 MG capsule sustained-release 24 hr Take 3 capsules by mouth Daily. 7/5/21   Librado Marcus MD        Social History:   Social History     Tobacco Use   • Smoking status: Former Smoker   • Smokeless tobacco: Never Used   • Tobacco comment: STARTED AGE 20; QUIT AT AGE 34   Vaping Use   • Vaping Use: Every day   • Substances: Nicotine   Substance Use Topics   • Alcohol use: Never     Comment: rare   • Drug use: Not on file     Recent travel: not applicable     Review of Systems:  Review of Systems   Unable to perform ROS: Mental status change   Neurological:        Minimal responsiveness per EMS.        Physical Exam:  /71   Pulse 52   Temp 97.8 °F (36.6 °C) (Oral)   Resp 20   Ht 170.2 cm (67\")   Wt 61.5 kg (135 lb 9.3 oz)   SpO2 100%   BMI 21.24 kg/m²     Physical Exam  Vitals and nursing note reviewed.   Constitutional:       General: She is not in acute distress.     Comments: She is lethargic and appears intoxicated.   HENT:      Head: Normocephalic and atraumatic.      Nose: Nose normal.      Mouth/Throat:      Mouth: Mucous membranes are moist.      Pharynx: Oropharynx is clear. Uvula midline.   Eyes:      General: No scleral icterus.     Extraocular Movements: Extraocular movements intact.      Pupils: Pupils are equal, round, and reactive to light.      Comments: Her eyes are open.   Cardiovascular:      Rate and Rhythm: Normal rate and regular rhythm.      Heart sounds: Normal heart sounds. No murmur heard.     Pulmonary:      Effort: No respiratory distress.      Breath sounds: Normal breath sounds.   Abdominal:      Palpations: Abdomen is soft.   Musculoskeletal:         General: Normal range of " motion.      Cervical back: Neck supple.      Right lower leg: No edema.      Left lower leg: No edema.   Skin:     General: Skin is warm and dry.   Neurological:      Mental Status: She is lethargic.      Motor: Motor function is intact.      Comments: She is sedated and lethargic. It appears that she overdosed on Seroquel. She is able to move her extremities.                Medications in the Emergency Department:  Medications   sodium chloride 0.9 % flush 10 mL (10 mL Intravenous Given 8/4/21 2207)   sodium chloride 0.9 % flush 10 mL (has no administration in time range)   famotidine (PEPCID) tablet 40 mg (has no administration in time range)   sennosides-docusate (PERICOLACE) 8.6-50 MG per tablet 2 tablet (2 tablets Oral Not Given 8/4/21 2207)     And   polyethylene glycol (MIRALAX) packet 17 g (has no administration in time range)     And   bisacodyl (DULCOLAX) EC tablet 5 mg (has no administration in time range)     And   bisacodyl (DULCOLAX) suppository 10 mg (has no administration in time range)   ondansetron (ZOFRAN) injection 4 mg (has no administration in time range)   multivitamin with minerals 1 tablet (has no administration in time range)   melatonin tablet 5 mg (has no administration in time range)   Pharmacy to Dose Zosyn (has no administration in time range)   propofol (DIPRIVAN) infusion 10 mg/mL 100 mL (20 mcg/kg/min × 61.5 kg Intravenous Rate/Dose Change 8/4/21 2206)   potassium chloride 10 mEq in 100 mL IVPB (10 mEq Intravenous New Bag 8/4/21 2206)   piperacillin-tazobactam (ZOSYN) 3.375 g in iso-osmotic dextrose 50 ml (premix) (has no administration in time range)   piperacillin-tazobactam (ZOSYN) 3.375 g in iso-osmotic dextrose 50 ml (premix) (has no administration in time range)   norepinephrine (LEVOPHED) 8 mg in 250 mL NS infusion (premix) (0.02 mcg/kg/min × 61.5 kg Intravenous Rate/Dose Change 8/4/21 2305)   Naloxone HCl (NARCAN) injection (2 mg Intravenous Given 8/4/21 1605)   sodium  chloride 0.9 % bolus 1,000 mL (0 mL Intravenous Stopped 8/4/21 1918)   Naloxone HCl (NARCAN) injection 2 mg (2 mg Intravenous Given 8/4/21 1615)   sodium chloride 0.9 % bolus 1,000 mL (0 mL Intravenous Stopped 8/4/21 1919)   etomidate (AMIDATE) injection (10 mg Intravenous Given 8/4/21 1643)   rocuronium (ZEMURON) injection (75 mg Intravenous Given 8/4/21 1643)        Labs  Lab Results (last 24 hours)     Procedure Component Value Units Date/Time    CBC & Differential [[id number]]  (Abnormal) Collected: 08/04/21 1615    Specimen: Blood Updated: 08/04/21 1623    Narrative:      The following orders were created for panel order CBC & Differential.  Procedure                               Abnormality         Status                     ---------                               -----------         ------                     CBC Auto Differential[[id number]]        Abnormal            Final result                 Please view results for these tests on the individual orders.    Comprehensive Metabolic Panel [[id number]]  (Abnormal) Collected: 08/04/21 1615    Specimen: Blood Updated: 08/04/21 1640     Glucose 90 mg/dL      BUN 10 mg/dL      Creatinine 1.07 mg/dL      Sodium 135 mmol/L      Potassium 3.4 mmol/L      Chloride 105 mmol/L      CO2 23.0 mmol/L      Calcium 8.4 mg/dL      Total Protein 5.9 g/dL      Albumin 3.90 g/dL      ALT (SGPT) 13 U/L      AST (SGOT) 18 U/L      Alkaline Phosphatase 42 U/L      Total Bilirubin 0.3 mg/dL      eGFR Non African Amer 56 mL/min/1.73      Globulin 2.0 gm/dL      A/G Ratio 2.0 g/dL      BUN/Creatinine Ratio 9.3     Anion Gap 7.0 mmol/L     Narrative:      GFR Normal >60  Chronic Kidney Disease <60  Kidney Failure <15      Acetaminophen Level [[id number]]  (Normal) Collected: 08/04/21 1615    Specimen: Blood Updated: 08/04/21 1640     Acetaminophen 5.2 mcg/mL     Ethanol [[id number]] Collected: 08/04/21 1615    Specimen: Blood Updated: 08/04/21 1640     Ethanol <10 mg/dL      Ethanol %  <0.010 %     Narrative:      Ethanol (Plasma)  <10 Essentially Negative    Toxic Concentrations           mg/dL    Flushing, slowing of reflexes    Impaired visual activity         Depression of CNS              >100  Possible Coma                  >300       Salicylate Level [[id number]]  (Normal) Collected: 08/04/21 1615    Specimen: Blood Updated: 08/04/21 1640     Salicylate 0.4 mg/dL     Urine Drug Screen - Urine, Clean Catch [[id number]]  (Abnormal) Collected: 08/04/21 1615    Specimen: Urine, Clean Catch Updated: 08/04/21 1639     Amphet/Methamphet, Screen Positive     Barbiturates Screen, Urine Negative     Benzodiazepine Screen, Urine Positive     Cocaine Screen, Urine Negative     Opiate Screen Negative     THC, Screen, Urine Positive     Methadone Screen, Urine Negative     Oxycodone Screen, Urine Positive    Narrative:      Negative Thresholds Per Drugs Screened:    Amphetamines                 500 ng/ml  Barbiturates                 200 ng/ml  Benzodiazepines              100 ng/ml  Cocaine                      300 ng/ml  Methadone                    300 ng/ml  Opiates                      300 ng/ml  Oxycodone                    100 ng/ml  THC                           50 ng/ml    The Normal Value for all drugs tested is negative. This report includes final unconfirmed screening results to be used for medical treatment purposes only. Unconfirmed results must not be used for non-medical purposes such as employment or legal testing. Clinical consideration should be applied to any drug of abuse test, particularly when unconfirmed results are used.            CBC Auto Differential [[id number]]  (Abnormal) Collected: 08/04/21 1615    Specimen: Blood Updated: 08/04/21 1623     WBC 4.51 10*3/mm3      RBC 3.91 10*6/mm3      Hemoglobin 12.4 g/dL      Hematocrit 35.9 %      MCV 91.8 fL      MCH 31.7 pg      MCHC 34.5 g/dL      RDW 11.9 %      RDW-SD 39.7 fl      MPV 8.9 fL      Platelets 187 10*3/mm3       Neutrophil % 41.2 %      Lymphocyte % 48.6 %      Monocyte % 7.1 %      Eosinophil % 2.2 %      Basophil % 0.7 %      Immature Grans % 0.2 %      Neutrophils, Absolute 1.86 10*3/mm3      Lymphocytes, Absolute 2.19 10*3/mm3      Monocytes, Absolute 0.32 10*3/mm3      Eosinophils, Absolute 0.10 10*3/mm3      Basophils, Absolute 0.03 10*3/mm3      Immature Grans, Absolute 0.01 10*3/mm3      nRBC 0.0 /100 WBC     Respiratory Culture - Sputum, ET Suction [[id number]] Collected: 08/04/21 1655    Specimen: Sputum from ET Suction Updated: 08/04/21 1917     Gram Stain Occasional Gram positive cocci in pairs and chains      Occasional Gram negative bacilli           Imaging:  XR Chest 1 View    Result Date: 8/4/2021  PROCEDURE: XR CHEST 1 VW  COMPARISON: [facility], , CHEST AP/PA 1 VIEW, 1/01/2018, 20:06.  INDICATIONS: post-tube  FINDINGS:   The lungs are well-expanded. The heart and pulmonary vasculature are within normal limits. No pleural effusions are identified. There are no active appearing infiltrates.  No evidence of pneumothorax.  New endotracheal tube in good position 2.3 cm proximal to the hraini.  Nasogastric tube tip projects below the diaphragm.  IMPRESSION:  New endotracheal tube and nasogastric tube appear in good position.  No active disease.  VENU ROUSE MD       Electronically Signed and Approved By: VENU ROUSE MD on 8/04/2021 at 16:57               Procedures:  Intubation    Date/Time: 8/4/2021 4:43 PM  Performed by: Shailesh Peter MD  Authorized by: Shailesh Peter MD     Consent:     Consent obtained:  Emergent situation  Pre-procedure details:     Patient status:  Altered mental status    Paralytics:  Rocuronium  Procedure details:     CPR in progress: no      Intubation method:  Oral    Oral intubation technique:  Video-assisted    Tube size (mm):  7.5    Number of attempts:  1  Placement assessment:     ETT to teeth:  23    Breath sounds:  Equal    Placement verification:  chest rise, CXR verification and equal breath sounds      CXR findings:  ETT in proper place  Post-procedure details:     Patient tolerance of procedure:  Tolerated well, no immediate complications  Comments:      Patient was given etomidate.        EKG:    Rhythm: Normal sinus rhythm.  Rate: 60.  Normal P waves.  Normal QRS.  Normal ST.  QRS: 93.  QTc: 424.    Normal EKG.    EKG Comparison: N/A    Interpreted by me.      Progress  ED Course as of Aug 04 2314   Wed Aug 04, 2021   1638 The patient's father has arrived in the ED. They state she has been depressed recently and has been having difficulty in her relationship. She was found in bed by her father today with pill bottles nearby.    [RF]      ED Course User Index  [RF] Kallie Agarwal                            Medical Decision Making:  MDM  Number of Diagnoses or Management Options     Amount and/or Complexity of Data Reviewed  Clinical lab tests: reviewed  Tests in the medicine section of CPT®: reviewed    Critical Care  Total time providing critical care: 30-74 minutes (I spent greater than 35 minutes in critical care for this patient, excluding any time spent on any billable procedures such as intubation.    I reviewed the blood work and vital signs including pulse oximetry, cardiac output, chest x-ray and EKG.    I titrated her propofol for sedation and titrated her ventilator settings.    I reassessed the patient at the bedside, and consulted another physician to be involved in this patient's medical care.)             This patient is a 41-year-old female who is currently going through a divorce and custody issues and has upcoming court case scheduled for tomorrow, who apparently overdosed on her Seroquel and possibly other meds and substances.    She arrives via EMS and is quite lethargic and appears intoxicated.    Given the Seroquel we were concerned about QTC prolongation, but her EKG initially showed none of that.    She also started dry heaving and  I am getting concerned about her airway here as she is becoming increasingly sedated during her ED course.    I had to eventually intubate her for airway protection as she is very lethargic at this time after her overdose.    We are continuing some IV fluids and propofol for sedation and I am admitting her to the ICU.    Final diagnoses:   Intentional overdose of drug in tablet form (CMS/[facility])   Substance abuse (CMS/[facility])   Somnolence        Disposition:  ED Disposition     ED Disposition Condition Comment    Decision to Admit  Level of Care: Critical Care [6]   Diagnosis: Intentional overdose of drug in tablet form (CMS/[facility]) [[id number]]   Admitting Physician: HUSSAIN NEGRON [[id number]]   Attending Physician: HUSSAIN NEGRON [[id number]]   Isolate for COVID?: No [0]   Certification: I Certify That Inpatient Hospital Services Are Medically Necessary For Greater Than 2 Midnights            Documentation assistance provided by Kallie Agarwal acting as scribe for Dr. Shailesh Peter. Information recorded by the scribe was done at my direction and has been verified and validated by me.        Kallie Agarwal  08/04/21 1617       Kallie Agarwal  08/04/21 1620       Kallie Agarwal  08/04/21 1622       Kallie Agarwal  08/04/21 1637       Kallie Agarwal  08/04/21 1639       Kallie Agarwal  08/04/21 1647       Kallie Agarwal  08/04/21 1714       Kallie Agarwal  08/04/21 1721       Shailesh Peter MD  08/04/21 [id number]
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
## 2021-08-05 LAB
ALBUMIN SERPL-MCNC: 4.3 G/DL (ref 3.5–5.2)
ALBUMIN/GLOB SERPL: 1.8 G/DL
ALP SERPL-CCNC: 49 U/L (ref 39–117)
ALT SERPL W P-5'-P-CCNC: 25 U/L (ref 1–33)
AMMONIA BLD-SCNC: 92 UMOL/L (ref 11–51)
ANION GAP SERPL CALCULATED.3IONS-SCNC: 10.1 MMOL/L (ref 5–15)
AST SERPL-CCNC: 55 U/L (ref 1–32)
BASOPHILS # BLD AUTO: 0.03 10*3/MM3 (ref 0–0.2)
BASOPHILS NFR BLD AUTO: 0.3 % (ref 0–1.5)
BILIRUB SERPL-MCNC: 0.3 MG/DL (ref 0–1.2)
BUN SERPL-MCNC: 10 MG/DL (ref 6–20)
BUN/CREAT SERPL: 11.1 (ref 7–25)
CALCIUM SPEC-SCNC: 8.6 MG/DL (ref 8.6–10.5)
CHLORIDE SERPL-SCNC: 105 MMOL/L (ref 98–107)
CO2 SERPL-SCNC: 18.9 MMOL/L (ref 22–29)
CREAT SERPL-MCNC: 0.9 MG/DL (ref 0.57–1)
D-LACTATE SERPL-SCNC: 2.4 MMOL/L (ref 0.5–2)
DEPRECATED RDW RBC AUTO: 41.6 FL (ref 37–54)
EOSINOPHIL # BLD AUTO: 0.06 10*3/MM3 (ref 0–0.4)
EOSINOPHIL NFR BLD AUTO: 0.7 % (ref 0.3–6.2)
ERYTHROCYTE [DISTWIDTH] IN BLOOD BY AUTOMATED COUNT: 11.9 % (ref 12.3–15.4)
GFR SERPL CREATININE-BSD FRML MDRD: 69 ML/MIN/1.73
GLOBULIN UR ELPH-MCNC: 2.4 GM/DL
GLUCOSE SERPL-MCNC: 88 MG/DL (ref 65–99)
HCT VFR BLD AUTO: 42.2 % (ref 34–46.6)
HGB BLD-MCNC: 13.8 G/DL (ref 12–15.9)
IMM GRANULOCYTES # BLD AUTO: 0.03 10*3/MM3 (ref 0–0.05)
IMM GRANULOCYTES NFR BLD AUTO: 0.3 % (ref 0–0.5)
L PNEUMO1 AG UR QL IA: NEGATIVE
LYMPHOCYTES # BLD AUTO: 1.5 10*3/MM3 (ref 0.7–3.1)
LYMPHOCYTES NFR BLD AUTO: 16.6 % (ref 19.6–45.3)
M PNEUMO IGM SER QL: POSITIVE
MAGNESIUM SERPL-MCNC: 1.9 MG/DL (ref 1.6–2.6)
MCH RBC QN AUTO: 31.4 PG (ref 26.6–33)
MCHC RBC AUTO-ENTMCNC: 32.7 G/DL (ref 31.5–35.7)
MCV RBC AUTO: 95.9 FL (ref 79–97)
MONOCYTES # BLD AUTO: 0.57 10*3/MM3 (ref 0.1–0.9)
MONOCYTES NFR BLD AUTO: 6.3 % (ref 5–12)
NEUTROPHILS NFR BLD AUTO: 6.82 10*3/MM3 (ref 1.7–7)
NEUTROPHILS NFR BLD AUTO: 75.8 % (ref 42.7–76)
NRBC BLD AUTO-RTO: 0 /100 WBC (ref 0–0.2)
PHOSPHATE SERPL-MCNC: 2.5 MG/DL (ref 2.5–4.5)
PLATELET # BLD AUTO: 187 10*3/MM3 (ref 140–450)
PMV BLD AUTO: 9.4 FL (ref 6–12)
POTASSIUM SERPL-SCNC: 3.8 MMOL/L (ref 3.5–5.2)
PROT SERPL-MCNC: 6.7 G/DL (ref 6–8.5)
RBC # BLD AUTO: 4.4 10*6/MM3 (ref 3.77–5.28)
S PNEUM AG SPEC QL LA: NEGATIVE
SODIUM SERPL-SCNC: 134 MMOL/L (ref 136–145)
WBC # BLD AUTO: 9.01 10*3/MM3 (ref 3.4–10.8)

## 2021-08-05 PROCEDURE — 94799 UNLISTED PULMONARY SVC/PX: CPT

## 2021-08-05 PROCEDURE — 83605 ASSAY OF LACTIC ACID: CPT | Performed by: INTERNAL MEDICINE

## 2021-08-05 PROCEDURE — 25010000002 PROPOFOL 10 MG/ML EMULSION: Performed by: EMERGENCY MEDICINE

## 2021-08-05 PROCEDURE — 93005 ELECTROCARDIOGRAM TRACING: CPT | Performed by: INTERNAL MEDICINE

## 2021-08-05 PROCEDURE — 87899 AGENT NOS ASSAY W/OPTIC: CPT | Performed by: INTERNAL MEDICINE

## 2021-08-05 PROCEDURE — 94003 VENT MGMT INPAT SUBQ DAY: CPT

## 2021-08-05 PROCEDURE — 25010000002 PIPERACILLIN SOD-TAZOBACTAM PER 1 G: Performed by: INTERNAL MEDICINE

## 2021-08-05 PROCEDURE — 82140 ASSAY OF AMMONIA: CPT | Performed by: INTERNAL MEDICINE

## 2021-08-05 PROCEDURE — 99291 CRITICAL CARE FIRST HOUR: CPT | Performed by: INTERNAL MEDICINE

## 2021-08-05 PROCEDURE — 86738 MYCOPLASMA ANTIBODY: CPT | Performed by: INTERNAL MEDICINE

## 2021-08-05 PROCEDURE — 99233 SBSQ HOSP IP/OBS HIGH 50: CPT | Performed by: FAMILY MEDICINE

## 2021-08-05 PROCEDURE — 94002 VENT MGMT INPAT INIT DAY: CPT

## 2021-08-05 PROCEDURE — 84100 ASSAY OF PHOSPHORUS: CPT | Performed by: INTERNAL MEDICINE

## 2021-08-05 PROCEDURE — 85025 COMPLETE CBC W/AUTO DIFF WBC: CPT | Performed by: INTERNAL MEDICINE

## 2021-08-05 PROCEDURE — 80053 COMPREHEN METABOLIC PANEL: CPT | Performed by: INTERNAL MEDICINE

## 2021-08-05 PROCEDURE — 83735 ASSAY OF MAGNESIUM: CPT | Performed by: INTERNAL MEDICINE

## 2021-08-05 PROCEDURE — 87040 BLOOD CULTURE FOR BACTERIA: CPT | Performed by: INTERNAL MEDICINE

## 2021-08-05 RX ORDER — LACTULOSE 10 G/15ML
30 SOLUTION ORAL 3 TIMES DAILY
Status: DISCONTINUED | OUTPATIENT
Start: 2021-08-05 | End: 2021-08-06

## 2021-08-05 RX ADMIN — LACTULOSE 30 G: 20 SOLUTION ORAL at 21:26

## 2021-08-05 RX ADMIN — LACTULOSE 30 G: 20 SOLUTION ORAL at 10:29

## 2021-08-05 RX ADMIN — LACTULOSE 30 G: 20 SOLUTION ORAL at 17:01

## 2021-08-05 RX ADMIN — TAZOBACTAM SODIUM AND PIPERACILLIN SODIUM 3.38 G: 375; 3 INJECTION, SOLUTION INTRAVENOUS at 21:38

## 2021-08-05 RX ADMIN — PROPOFOL 25 MCG/KG/MIN: 10 INJECTION, EMULSION INTRAVENOUS at 04:34

## 2021-08-05 RX ADMIN — TAZOBACTAM SODIUM AND PIPERACILLIN SODIUM 3.38 G: 375; 3 INJECTION, SOLUTION INTRAVENOUS at 01:19

## 2021-08-05 RX ADMIN — TAZOBACTAM SODIUM AND PIPERACILLIN SODIUM 3.38 G: 375; 3 INJECTION, SOLUTION INTRAVENOUS at 17:01

## 2021-08-05 RX ADMIN — SODIUM CHLORIDE, PRESERVATIVE FREE 10 ML: 5 INJECTION INTRAVENOUS at 10:29

## 2021-08-05 RX ADMIN — DOCUSATE SODIUM 50MG AND SENNOSIDES 8.6MG 2 TABLET: 8.6; 5 TABLET, FILM COATED ORAL at 10:28

## 2021-08-05 RX ADMIN — SODIUM CHLORIDE, PRESERVATIVE FREE 10 ML: 5 INJECTION INTRAVENOUS at 21:38

## 2021-08-05 RX ADMIN — TAZOBACTAM SODIUM AND PIPERACILLIN SODIUM 3.38 G: 375; 3 INJECTION, SOLUTION INTRAVENOUS at 05:46

## 2021-08-05 RX ADMIN — FAMOTIDINE 40 MG: 20 TABLET ORAL at 10:28

## 2021-08-05 RX ADMIN — Medication 1 TABLET: at 10:28

## 2021-08-05 NOTE — PROGRESS NOTES
[facility]   Hospitalist Progress Note  Date: 2021  Patient Name: Maddy Garcia  : 1980  MRN: [id number]  Date of admission: 2021      Subjective   Subjective     Admitted: 2021  Chief complaint: Intentional drug overdose  Consultants: Dr. Spencer-critical care  Antibiotics: Zosyn  Summary:   41-year-old female with history of complicated psychiatric disorders, recently going through a divorce, under a great deal of stress, she tried to overdose on Seroquel, patient's father found her unresponsive, brought to the emergency room, intubated to protect her airway, critical care consulted.  Remains intubated.    Interval follow-up: Patient seen and examined, no acute distress, no acute overnight events, remains on mechanical ventilation, off sedation, still has somnolence, high ammonia level, started lactulose orally by feeding tube, requiring Levophed overnight to maintain blood pressures.  Telemetry reviewed, no acute major arrhythmic events.    Review of systems:  10 point ROS unable to obtain due to patient being on ventilator    Objective   Objective     Vitals:   Temp:  [97.8 °F (36.6 °C)-99.4 °F (37.4 °C)] 99.4 °F (37.4 °C)  Heart Rate:  [51-75] 73  Resp:  [14-20] 20  BP: ()/() 98/47  Flow (L/min):  [16] 16  FiO2 (%):  [30 %] 30 %  Physical Exam    Constitutional: On the vent, responsive to tactile stimuli   Eyes: Pupils equal, sclerae anicteric, no conjunctival injection   HENT: NCAT, ET tube secured, feeding tube in place   Neck: Supple, no masses   Respiratory: Air entry bilaterally, ventilator breath sounds   Cardiovascular: RRR, no murmurs, rubs, or gallops   Gastrointestinal: Positive bowel sounds, soft, nondistended   Musculoskeletal: No bilateral ankle edema, no clubbing or cyanosis to extremities   Psychiatric: Unable to obtain due to patient being on ventilator   Neurologic: Unable to obtain due to patient being on ventilator   Skin: No rashes    : Arellano catheter in  place    Result Review    Result Review:  I have personally reviewed the results from the time of this admission to 8/5/2021 13:41 EDT and agree with these findings:  [x]  Laboratory  [x]  Microbiology  [x]  Radiology  [x]  EKG/Telemetry   []  Cardiology/Vascular   []  Pathology  []  Old records  []  Other:    Assessment/Plan   Assessment / Plan     Assessment/Plan:  Assessment:  Intentional drug overdose with prescription medications  Depression with suicide attempt  Opioid use, and vitamin use, benzodiazepine use  Hypokalemia  Toxic encephalopathy  Unable to protect airway  Anxiety  Hyponatremia  Transaminitis  Lactic acidosis  Hyperammonemia  Mycoplasma IgM positive    Plan:  Labs and imaging reviewed  Continue Zosyn  Continue Levophed to maintain pressures  Vent management per Dr. Spencer, discussed with him  Consider extubation later today  Telemetry monitoring to follow QTC  Safety sitter at bedside  Once extubated, will consult psych  Start lactulose 30 g 3 times daily  Goal to achieve 2-3 bowel movements today  A.m. labs  Full code  VTE prophylaxis with SCDs  Clinical course will dictate further management  Discussed with nurse at the bedside, patient's father at the bedside    DVT prophylaxis:  Mechanical DVT prophylaxis orders are present.    CODE STATUS:   Code Status: CPR  Medical Interventions (Level of Support Prior to Arrest): Full        Electronically signed by Chao Ho MD, 08/05/21, 1:41 PM EDT.

## 2021-08-05 NOTE — PROGRESS NOTES
[facility]     Progress Note    Patient Name: Maddy Garcia  : 1980  MRN: [id number]  Primary Care Physician:  Jailene Mitchell, VILMA  Date of admission: 2021    Subjective   Subjective       Chief Complaint:   Patient is critically ill in ICU with drug overdose, altered mental status, on mechanical ventilator.    Critical drips: Propofol drip at 20 mcg/kg/min, Levophed drip.  Lines and tubes: ET tube orally    Over last 24 hours, admitted with altered mental status, with drug overdose including Seroquel and Vayvnase.  Also was positive for benzodiazepine, opiate and amphetamine.    Overnight, no acute events.     This morning,  Somnolent, arousable on mechanical ventilator.  Has high serum ammonia.  EKG with normal QTC.  No fever noted.  Father at bedside.  Currently on Levophed drip through peripheral line.      Review of Systems  Could not be obtained, patient not optimally responsive on ventilator.    Objective   Objective     Vitals:   Temp:  [97.8 °F (36.6 °C)-98.1 °F (36.7 °C)] 98.1 °F (36.7 °C)  Heart Rate:  [51-75] 65  Resp:  [14-20] 20  BP: ()/() 98/61  Flow (L/min):  [16] 16  FiO2 (%):  [30 %] 30 %  Physical Exam   Vital Signs Reviewed   Young female, on invasive mechanical ventilator, responsive to noxious stimuli  HEENT:  PERRL, EOMI.  OP, nares clear, no sinus tenderness, ET tube in place  Neck:  Supple, no JVD, no thyromegaly  Lymph: no axillary, cervical, supraclavicular lymphadenopathy noted bilaterally  Chest:   Bilateral diminished breath sounds, coarse mechanical ventilator breath sounds, no wheezing crackles or rhonchi, resonant to percussion bilaterally   CV: RRR, no MGR, pulses 2+, equal.  Abd:  Soft, NT, ND, + BS, no HSM  EXT:  no clubbing, no cyanosis, no edema, no joint tenderness  Neuro:  A&Ox0, CN grossly intact, sedated on ventilator   Skin: No rashes or lesions noted       Result Review    Result Review:  I have personally reviewed the results from the  time of this admission to 8/5/2021 07:57 EDT and agree with these findings:  [x]  Laboratory  [x]  Microbiology  [x]  Radiology  [x]  EKG/Telemetry   [x]  Cardiology/Vascular   []  Pathology  []  Old records  []  Other:  Most notable findings include: High serum ammonia, 90 today.  EKG with normal QTC now.    Assessment/Plan   Assessment / Plan     Brief Patient Summary:  Maddy Garcia is a 41 y.o. female   Intentional drug overdose with Seroquel, Vyvanase  Depression, history of suicidal attempt  U tox positive for benzodiazepine, amphetamine and opiate  Hypokalemia  Altered mental status  Toxic metabolic encephalopathy     Active Hospital Problems:  Active Hospital Problems    Diagnosis    • Intentional overdose of drug in tablet form (CMS/[facility])      Plan:   Plan:  Mechanical ventilator, change vent settings to AC VC 20/420/PEEP of 5, FiO2 to keep saturations more than 90%.  Pressure support as tolerated today.  Will consider extubation today.  EKG this morning with normal QTC.  Discontinue sedatives.  Wean propofol off.  Will need psychiatric evaluation after extubation.  Has been started on Zosyn by primary service.  Check sputum culture.  Start lactulose.  Replete electrolytes if needed.    DVT prophylaxis:  Mechanical DVT prophylaxis orders are present.    CODE STATUS:   Code Status: CPR  Medical Interventions (Level of Support Prior to Arrest): Full    Prognosis: Guarded     Patient is critically ill in ICU with drug overdose, metabolic encephalopathy, altered mental status, suicidal intent with depression.  I spent 31 minutes of critical care time excluding any procedure notes, spent in review, analysis, obtaining history and physical, formulating care plan, and I lead multi-disciplinary critical care rounds with bedside nurse, respiratory therapist, clinical pharmacist and other allied services. I have discussed the base with primary service and other consultants as well.     Electronically signed by Yassine  GIORGIO Spencer MD, 8/5/2021, 07:57 EDT.

## 2021-08-05 NOTE — PLAN OF CARE
Goal Outcome Evaluation:  Plan of Care Reviewed With: patient, father        Progress: no change  Outcome Summary: Patient was unable to be extubated today due to periods of apnea while on PS.  Will continue to monitor.

## 2021-08-05 NOTE — PLAN OF CARE
Goal Outcome Evaluation:      Pt family at the bedside throughout the night. Pt has a close watch in the room. Currently on light sedation propofol at 25. Pupils reactive, will arouse to voice at this time. Pt father states she is going through a nasty divorce and requested her to be a secured patient he is aware that we would have to call him with updates and we cannot give any pt info over the phone if he were to call.   Shannan Salgado RN

## 2021-08-06 LAB
ALBUMIN SERPL-MCNC: 3.5 G/DL (ref 3.5–5.2)
ALBUMIN/GLOB SERPL: 1.4 G/DL
ALP SERPL-CCNC: 51 U/L (ref 39–117)
ALT SERPL W P-5'-P-CCNC: 23 U/L (ref 1–33)
ANION GAP SERPL CALCULATED.3IONS-SCNC: 10.8 MMOL/L (ref 5–15)
AST SERPL-CCNC: 56 U/L (ref 1–32)
BACTERIA SPEC RESP CULT: NORMAL
BASOPHILS # BLD AUTO: 0.05 10*3/MM3 (ref 0–0.2)
BASOPHILS NFR BLD AUTO: 0.4 % (ref 0–1.5)
BILIRUB SERPL-MCNC: 0.4 MG/DL (ref 0–1.2)
BUN SERPL-MCNC: 9 MG/DL (ref 6–20)
BUN/CREAT SERPL: 12.5 (ref 7–25)
CALCIUM SPEC-SCNC: 8 MG/DL (ref 8.6–10.5)
CHLORIDE SERPL-SCNC: 106 MMOL/L (ref 98–107)
CO2 SERPL-SCNC: 19.2 MMOL/L (ref 22–29)
CREAT SERPL-MCNC: 0.72 MG/DL (ref 0.57–1)
DEPRECATED RDW RBC AUTO: 41 FL (ref 37–54)
EOSINOPHIL # BLD AUTO: 0.09 10*3/MM3 (ref 0–0.4)
EOSINOPHIL NFR BLD AUTO: 0.7 % (ref 0.3–6.2)
ERYTHROCYTE [DISTWIDTH] IN BLOOD BY AUTOMATED COUNT: 11.9 % (ref 12.3–15.4)
GFR SERPL CREATININE-BSD FRML MDRD: 89 ML/MIN/1.73
GLOBULIN UR ELPH-MCNC: 2.5 GM/DL
GLUCOSE SERPL-MCNC: 89 MG/DL (ref 65–99)
GRAM STN SPEC: NORMAL
GRAM STN SPEC: NORMAL
HCT VFR BLD AUTO: 39.4 % (ref 34–46.6)
HGB BLD-MCNC: 13.3 G/DL (ref 12–15.9)
IMM GRANULOCYTES # BLD AUTO: 0.04 10*3/MM3 (ref 0–0.05)
IMM GRANULOCYTES NFR BLD AUTO: 0.3 % (ref 0–0.5)
LYMPHOCYTES # BLD AUTO: 1.79 10*3/MM3 (ref 0.7–3.1)
LYMPHOCYTES NFR BLD AUTO: 14.2 % (ref 19.6–45.3)
MAGNESIUM SERPL-MCNC: 2 MG/DL (ref 1.6–2.6)
MCH RBC QN AUTO: 31.6 PG (ref 26.6–33)
MCHC RBC AUTO-ENTMCNC: 33.8 G/DL (ref 31.5–35.7)
MCV RBC AUTO: 93.6 FL (ref 79–97)
MONOCYTES # BLD AUTO: 1.05 10*3/MM3 (ref 0.1–0.9)
MONOCYTES NFR BLD AUTO: 8.3 % (ref 5–12)
NEUTROPHILS NFR BLD AUTO: 76.1 % (ref 42.7–76)
NEUTROPHILS NFR BLD AUTO: 9.61 10*3/MM3 (ref 1.7–7)
NRBC BLD AUTO-RTO: 0 /100 WBC (ref 0–0.2)
PHOSPHATE SERPL-MCNC: 2.7 MG/DL (ref 2.5–4.5)
PLATELET # BLD AUTO: 167 10*3/MM3 (ref 140–450)
PMV BLD AUTO: 10 FL (ref 6–12)
POTASSIUM SERPL-SCNC: 3.8 MMOL/L (ref 3.5–5.2)
PROT SERPL-MCNC: 6 G/DL (ref 6–8.5)
RBC # BLD AUTO: 4.21 10*6/MM3 (ref 3.77–5.28)
SODIUM SERPL-SCNC: 136 MMOL/L (ref 136–145)
WBC # BLD AUTO: 12.63 10*3/MM3 (ref 3.4–10.8)

## 2021-08-06 PROCEDURE — 94799 UNLISTED PULMONARY SVC/PX: CPT

## 2021-08-06 PROCEDURE — 85025 COMPLETE CBC W/AUTO DIFF WBC: CPT | Performed by: INTERNAL MEDICINE

## 2021-08-06 PROCEDURE — 94003 VENT MGMT INPAT SUBQ DAY: CPT

## 2021-08-06 PROCEDURE — 99291 CRITICAL CARE FIRST HOUR: CPT | Performed by: INTERNAL MEDICINE

## 2021-08-06 PROCEDURE — 25010000002 PIPERACILLIN SOD-TAZOBACTAM PER 1 G: Performed by: INTERNAL MEDICINE

## 2021-08-06 PROCEDURE — 92610 EVALUATE SWALLOWING FUNCTION: CPT

## 2021-08-06 PROCEDURE — 83735 ASSAY OF MAGNESIUM: CPT | Performed by: INTERNAL MEDICINE

## 2021-08-06 PROCEDURE — 99232 SBSQ HOSP IP/OBS MODERATE 35: CPT | Performed by: INTERNAL MEDICINE

## 2021-08-06 PROCEDURE — 84100 ASSAY OF PHOSPHORUS: CPT | Performed by: INTERNAL MEDICINE

## 2021-08-06 PROCEDURE — 80053 COMPREHEN METABOLIC PANEL: CPT | Performed by: INTERNAL MEDICINE

## 2021-08-06 RX ORDER — LACTULOSE 10 G/15ML
30 SOLUTION ORAL 4 TIMES DAILY
Status: DISCONTINUED | OUTPATIENT
Start: 2021-08-06 | End: 2021-08-07

## 2021-08-06 RX ADMIN — SODIUM CHLORIDE, PRESERVATIVE FREE 10 ML: 5 INJECTION INTRAVENOUS at 21:28

## 2021-08-06 RX ADMIN — Medication 5 MG: at 21:27

## 2021-08-06 RX ADMIN — TAZOBACTAM SODIUM AND PIPERACILLIN SODIUM 3.38 G: 375; 3 INJECTION, SOLUTION INTRAVENOUS at 05:34

## 2021-08-06 RX ADMIN — LACTULOSE 30 G: 20 SOLUTION ORAL at 21:27

## 2021-08-06 RX ADMIN — TAZOBACTAM SODIUM AND PIPERACILLIN SODIUM 3.38 G: 375; 3 INJECTION, SOLUTION INTRAVENOUS at 16:16

## 2021-08-06 RX ADMIN — TAZOBACTAM SODIUM AND PIPERACILLIN SODIUM 3.38 G: 375; 3 INJECTION, SOLUTION INTRAVENOUS at 21:30

## 2021-08-06 NOTE — PROGRESS NOTES
[facility]     Progress Note    Patient Name: Maddy Garcia  : 1980  MRN: [id number]  Primary Care Physician:  Jailene Mitchell APRN  Date of admission: 2021    Subjective   Subjective       Chief Complaint:   Patient is critically ill in ICU with drug overdose, altered mental status, on mechanical ventilator.    Critical drips: Off all sedatives  Lines and tubes: ET tube orally    Over last 24 hours, remained on mechanical ventilator.  We had tried to put her on pressure support on mechanical ventilator, but patient was going apneic intermittently.    Overnight, no acute events.     This morning,  Resting on mechanical ventilator.  Following commands.  Wants the ET tube out.  Has been on lactulose for high ammonia.  EKG with normal QTC.  No fever noted.  Father at bedside.  She is off Levophed drip.      Review of Systems  Could not be obtained, patient not optimally responsive on ventilator.    Objective   Objective     Vitals:   Temp:  [99.4 °F (37.4 °C)-100.7 °F (38.2 °C)] 100.2 °F (37.9 °C)  Heart Rate:  [66-76] 72  Resp:  [20-33] 20  BP: ()/(39-70) 107/63  FiO2 (%):  [21 %-30 %] 21 %  Physical Exam   Vital Signs Reviewed   Young female, on invasive mechanical ventilator, responsive to questions, nods yes / no  HEENT:  PERRL, EOMI.  OP, nares clear, no sinus tenderness, ET tube in place  Neck:  Supple, no JVD, no thyromegaly  Lymph: no axillary, cervical, supraclavicular lymphadenopathy noted bilaterally  Chest:   Bilateral diminished breath sounds, coarse mechanical ventilator breath sounds, no wheezing crackles or rhonchi, resonant to percussion bilaterally   CV: RRR, no MGR, pulses 2+, equal.  Abd:  Soft, NT, ND, + BS, no HSM  EXT:  no clubbing, no cyanosis, no edema, no joint tenderness  Neuro:  A&Ox0, CN grossly intact, follows commands on mechanical ventilator  Skin: No rashes or lesions noted       Result Review    Result Review:  I have personally reviewed the results from  the time of this admission to 8/6/2021 07:46 EDT and agree with these findings:  [x]  Laboratory  [x]  Microbiology  [x]  Radiology  [x]  EKG/Telemetry   [x]  Cardiology/Vascular   []  Pathology  []  Old records  []  Other:  Most notable findings include: High serum ammonia, 90 yesterday.  EKG with normal QTC now.    Assessment/Plan   Assessment / Plan     Brief Patient Summary:  Maddy Garcia is a 41 y.o. female   Intentional drug overdose with Seroquel, Vyvanase  Depression, history of suicidal attempt  U tox positive for benzodiazepine, amphetamine and opiate  Hypokalemia  Altered mental status  Toxic metabolic encephalopathy     Active Hospital Problems:  Active Hospital Problems    Diagnosis    • Intentional overdose of drug in tablet form (CMS/[facility])      Plan:   Plan:  Mechanical ventilator, currently on pressure support 8/5.   We will extubate to nasal cannula.    EKG with normal QTC.  Discontinued sedatives.  Off Levophed drip now.  Will need psychiatric evaluation after extubation.  Has been started on Zosyn by primary service.  Check sputum culture.  Continue lactulose.  Increased dose.  Repeat ammonia level in the morning.  Replete electrolytes if needed.    DVT prophylaxis:  Mechanical DVT prophylaxis orders are present.    CODE STATUS:   Code Status: CPR  Medical Interventions (Level of Support Prior to Arrest): Full    Prognosis: Guarded     Patient is critically ill in ICU with drug overdose, metabolic encephalopathy, altered mental status, suicidal intent with depression.  I spent 32 minutes of critical care time excluding any procedure notes, spent in review, analysis, obtaining history and physical, formulating care plan, and I lead multi-disciplinary critical care rounds with bedside nurse, respiratory therapist, clinical pharmacist and other allied services. I have discussed the base with primary service and other consultants as well.     Electronically signed by Yassine Spencer MD, 8/6/2021,  07:46 EDT.

## 2021-08-06 NOTE — PLAN OF CARE
Goal Outcome Evaluation:           Progress: improving  Outcome Summary: Pt on PS ventilation most of the night, alert and appropriately responsive. Pt is tearful at times. Father at bedside this shift. Closewatch remains with patient. Plan is to extubate pt today. VSS. WIll continue to monitor.   Glabellar Complex Units: 15

## 2021-08-06 NOTE — PLAN OF CARE
Goal Outcome Evaluation:  Plan of Care Reviewed With: patient, father  Dysphagia evaluation completed following extubation.  Recommend begin diet of full liquids and advance as tolerated.  Speech therapy to follow for dysphagia and diet tolerance.

## 2021-08-06 NOTE — PROGRESS NOTES
[facility]   Hospitalist Progress Note  Date: 2021  Patient Name: Maddy Garcia  : 1980  MRN: [id number]  Date of admission: 2021      Subjective   Subjective     Chief Complaint: Intentional drug overdose    Summary: 41-year-old female with history of complicated psychiatric disorders, recently going through a divorce, under a great deal of stress, she tried to overdose on Seroquel, patient's father found her unresponsive, brought to the emergency room, intubated to protect her airway, critical care consulted.  Remains intubated.       Interval Followup: Patient seen post extubation on this morning.  Complains of a sore throat and being thirsty.      Review of Systems   All systems were reviewed and negative except for: As noted in HPI    Objective   Objective     Vitals:   Temp:  [100.2 °F (37.9 °C)-100.7 °F (38.2 °C)] 100.2 °F (37.9 °C)  Heart Rate:  [69-76] 76  Resp:  [20-33] 20  BP: ()/(39-71) 115/71  FiO2 (%):  [21 %-30 %] 21 %  Physical Exam    Constitutional: Awake, alert, no acute distress.  Voice is hoarse   Eyes: Pupils equal, sclerae anicteric, no conjunctival injection   HENT: NCAT, mucous membranes moist   Neck: Supple, no thyromegaly, no lymphadenopathy, trachea midline   Respiratory: Clear to auscultation bilaterally, nonlabored respirations    Cardiovascular: RRR, no murmurs, rubs, or gallops, palpable pedal pulses bilaterally   Gastrointestinal: Positive bowel sounds, soft, nontender, nondistended   Musculoskeletal: No bilateral ankle edema, no clubbing or cyanosis to extremities   Psychiatric: Appropriate affect, cooperative   Neurologic: Oriented x 3, strength symmetric in all extremities, Cranial Nerves grossly intact to confrontation, speech clear   Skin: No rashes     Result Review    Result Review:  I have personally reviewed the results from the time of this admission to 2021 09:49 EDT and agree with these findings:  [x]  Laboratory  []  Microbiology  []   Radiology  []  EKG/Telemetry   []  Cardiology/Vascular   []  Pathology  []  Old records  []  Other:    Assessment/Plan   Assessment / Plan     Assessment/Plan:    Assessment:  Intentional drug overdose with prescription medications  Depression with suicide attempt  Opioid use, and vitamin use, benzodiazepine use  Hypokalemia.  Repleted  Toxic encephalopathy  Unable to protect airway  Anxiety  Hyponatremia  Transaminitis  Lactic acidosis  Hyperammonemia  Mycoplasma IgM positive     Plan:  Labs and imaging reviewed  Continue Zosyn  Off Levophed maintaining blood pressures well.  Patient is extubated on this morning.    Telemetry monitoring to follow QTC  Safety sitter at bedside  Consult psych.    Continue lactulose 30 g 3 times daily  Goal to achieve 2-3 bowel movements today  A.m. labs  Full code  VTE prophylaxis with SCDs  Clinical course will dictate further management  Discussed with patient and her father at the bedside        Discussed plan with RN.    DVT prophylaxis:  Mechanical DVT prophylaxis orders are present.    CODE STATUS:   Code Status: CPR  Medical Interventions (Level of Support Prior to Arrest): Full        Electronically signed by Vernell Weinstein MD, 08/06/21, 9:49 AM EDT.

## 2021-08-06 NOTE — THERAPY EVALUATION
Acute Care - Speech Language Pathology   Swallow Initial Evaluation KAREN Chavez     Patient Name: Maddy Garcia  : 1980  MRN: [id number]  Today's Date: 2021               Admit Date: 2021    Visit Dx:     ICD-10-CM ICD-9-CM   1. Intentional overdose of drug in tablet form (CMS/[facility])  T50.902A 977.9   2. Substance abuse (CMS/[facility])  F19.10 305.90   3. Somnolence  R40.0 780.09   4. Oropharyngeal dysphagia  R13.12 787.22     Patient Active Problem List   Diagnosis   • Intentional overdose of drug in tablet form (CMS/[facility])     Past Medical History:   Diagnosis Date   • Anxiety    • Arthritis    • Bladder disorder    • Chest pain    • Chronic allergic rhinitis    • GERD (gastroesophageal reflux disease)    • Mood disorder (CMS/[facility])      Past Surgical History:   Procedure Laterality Date   • ABDOMINAL SURGERY     •  SECTION      HAD 3: ,,   • HYSTERECTOMY         SLP Recommendation and Plan     SLP Diet Recommendation: full liquid diet  Recommended Precautions and Strategies: upright posture during/after eating, small bites of food and sips of liquid  SLP Rec. for Method of Medication Administration: meds whole, as tolerated     Monitor for Signs of Aspiration: yes        Anticipated Discharge Disposition (SLP): inpatient rehabilitation facility     Therapy Frequency (Swallow): daily, 5 days per week  Predicted Duration Therapy Intervention (Days): until discharge                         Plan of Care Reviewed With: patient, father         SWALLOW EVALUATION (last 72 hours)      SLP Adult Swallow Evaluation     Row Name 21 1139                   Rehab Evaluation    Document Type  evaluation  -SN        Subjective Information  no complaints  -SN        Patient Observations  alert  -SN           Respiratory    Respiratory Status  room air  -SN        Date of Intubation  -- 21  -SN           Recommendations    Therapy Frequency (Swallow)  daily;5 days per week  -SN        Predicted  Duration Therapy Intervention (Days)  until discharge  -SN        SLP Diet Recommendation  full liquid diet  -SN        Recommended Precautions and Strategies  upright posture during/after eating;small bites of food and sips of liquid  -SN        SLP Rec. for Method of Medication Administration  meds whole;as tolerated  -SN        Monitor for Signs of Aspiration  yes  -SN          User Key  (r) = Recorded By, (t) = Taken By, (c) = Cosigned By    Initials Name Effective Dates    Mary Anne Guajardo SLP 03/31/21 -            Inpatient Speech Pathology Dysphagia Evaluation        PAIN SCALE: None indicated    PRECAUTIONS/CONTRAINDICATIONS: Standard, fall    SUSPECTED ABUSE/NEGLECT/EXPLOITATION: None identified    SOCIAL/PSYCHOLOGICAL NEEDS/BARRIERS: Safety sitter at bedside    PAST SOCIAL HISTORY: 41-year-old female, lives at home    PRIOR FUNCTION: No prior swallowing difficulties    PATIENT GOALS/EXPECTATIONS: Patient wants something to drink    HISTORY: Patient is referred for speech pathology dysphagia evaluation status post extubation.  Patient intubated 8/4/2021 and extubated 8/6/2021.  Patient currently on a nasal cannula.    CURRENT DIET LEVEL: N.p.o.    OBJECTIVE:    TEST ADMINISTERED: Clinical dysphagia evaluation    COGNITION/SAFETY AWARENESS: Appears appropriate for environment    BEHAVIORAL OBSERVATIONS: Very pleasant, alert and cooperative    ORAL MOTOR EXAM: Natural teeth.  Oral secretions resting in oral cavity.  Mild generalized decreased oral motor strength/range of motion 4/5    VOICE QUALITY: Hoarse with intermittent aphonia    REFLEX EXAM: Weak cough    POSTURE: Sitting fully upright    FEEDING/SWALLOWING FUNCTION: Assessed with ice chips, thin liquids, nectar thick liquids, purée solids, soft solids    CLINICAL OBSERVATIONS: Single ice chips given with anterior chewing.  Swallows completed with facial grimace.  Laryngeal sounds however clear to auscultation.  Nectar thickened liquids by spoon and by  "cup with delayed swallow with facial grimacing.  Patient indicating \"pain\" upon swallowing with pain scale of 5.  Thin liquids by spoon and by cup swallow completed.  Laryngeal sounds clear to auscultation.  Purée solids with swallow completed.  Double swallow observed.  Soft solids with chewing with swallow completed with double swallow.  Patient stating increased pain scale of 7 with soft solids.  Thin liquids by single sip via straw with swallow completed with throat clearing.  Subsequent trial of thin liquid via cup with swallow completed without throat clearing.  Laryngeal elevation noted to palpation.  Patient exhibiting discomfort when swallowing especially solid consistencies.  Throat clearing observed with thin liquids via straw.    DYSPHAGIA CRITERIA: Risk of aspiration    FUNCTIONAL ASSESSMENT INSTRUMENT: Patient currently scored a level 6 of 7 on Functional Communication Measures for swallowing indicating a 1-19% limitation in function.    ASSESSMENT/ PLAN OF CARE:  Pt presents with limitations, noted below, that impede patient's ability to initially tolerate regular consistency diet. The skills of a therapist will be required to safely and effectively implement the following treatment plan to restore maximal level of function.    PROBLEMS:  1.  Risk of aspiration   LTG 1: 30 days.  Patient will increase functional communication measures for swallowing to level 7 of 7, indicating a 0% mentation function.   STG 1a: 14 days.  Patient will tolerate least restrictive diet of full liquids with minimal to no signs or symptoms of aspiration.   STG 1b: 14 days.  Patient will tolerate trials of regular soft solids with minimal to no signs or symptoms of aspiration.   STG 1c: 14 days.  Patient will tolerate trials of regular crunchy solids with minimal to no signs or symptoms of aspiration.   TREATMENT: Speech therapy for dysphagia, education of strategies and tolerance of least restrictive " diet.      FREQUENCY/DURATION: Daily, 5 days a week    REHAB POTENTIAL:  Pt has good rehab potential.  The following limitations may influence improvement/ length of tx medical status.    RECOMMENDATIONS:   1.   DIET: Full liquids, advance to soft solids as tolerated by patient    2.  POSITION: Fully upright for all p.o. intake, 30 minutes following    3.  COMPENSATORY STRATEGIES: Small bites and sips    Pt/responsible party agrees with plan of care and has been informed of all alternatives, risks and benefits.    EDUCATION  The patient has been educated in the following areas:   Dysphagia (Swallowing Impairment).              Time Calculation:   Time Calculation- SLP     Row Name 08/06/21 1140             Time Calculation- SLP    SLP Start Time  1100  -SN      SLP Received On  08/06/21  -SN         Untimed Charges    21454-LU Eval Oral Pharyng Swallow Minutes  60  -SN         Total Minutes    Untimed Charges Total Minutes  60  -SN       Total Minutes  60  -SN        User Key  (r) = Recorded By, (t) = Taken By, (c) = Cosigned By    Initials Name Provider Type    SN Mary Anne Guajardo SLP Speech and Language Pathologist          Therapy Charges for Today     Code Description Service Date Service Provider Modifiers Qty    47242534101 HC ST EVAL ORAL PHARYNG SWALLOW 4 8/6/2021 Mary Anne Guajardo SLP GN 1               ALMA ROSA Ledesma  8/6/2021

## 2021-08-06 NOTE — CONSULTS
"Nutrition Services    Patient Name: Maddy Garcia  YOB: 1980  MRN: [id number]  Admission date: 2021      Clinical Nutrition Assessment      Reason for Assessment: MST score 2+       Clinical Course/Narrative: Patient reviewed related to MST score of 2 for unsure weight loss, no report of decreased appetite. Patient has no evidence of weight loss per chart review of recorded weight history. NPO now, SLP cleared patient for full liquids and advance as tolerated. RD will continue to monitor and follow per protocol.            Past Medical History:   Diagnosis Date   • Anxiety    • Arthritis    • Bladder disorder    • Chest pain    • Chronic allergic rhinitis    • GERD (gastroesophageal reflux disease)    • Mood disorder (CMS/HCC)        Past Surgical History:   Procedure Laterality Date   • ABDOMINAL SURGERY     •  SECTION      HAD 3: ,,   • HYSTERECTOMY            Nutrition Intervention:        PO Diet/Supplements: NPO Diet   No active supplement orders         Intake/Output:   Intake/Output Summary (Last 24 hours) at 2021 1302  Last data filed at 2021 0534  Gross per 24 hour   Intake 602 ml   Output 950 ml   Net -348 ml            Height: Height: 170.2 cm (67\")   Weight: Weight: 61.5 kg (135 lb 9.3 oz) (21 0600)   BMI: Body mass index is 21.24 kg/m².     Estimated/Assessed Needs       Energy Requirements    EST Needs (kcal/day) 0414-2734 kcal        Protein Requirements    EST Daily Needs (g/day) 62-74 g protein       Fluid Requirements     Estimated Needs (mL/day) 6424-7971 ml fluid      Results from last 7 days   Lab Units 21  0355 21  0050 21  1615   SODIUM mmol/L 136 134* 135*   POTASSIUM mmol/L 3.8 3.8 3.4*   CHLORIDE mmol/L 106 105 105   CO2 mmol/L 19.2* 18.9* 23.0   BUN mg/dL 9 10 10   CREATININE mg/dL 0.72 0.90 1.07*   CALCIUM mg/dL 8.0* 8.6 8.4*   BILIRUBIN mg/dL 0.4 0.3 0.3   ALK PHOS U/L 51 49 42   ALT (SGPT) U/L 23 25 13   AST (SGOT) " U/L 56* 55* 18   GLUCOSE mg/dL 89 88 90     Results from last 7 days   Lab Units 08/06/21  0355 08/05/21  0050 08/05/21  0050   MAGNESIUM mg/dL 2.0  --  1.9   PHOSPHORUS mg/dL 2.7   < > 2.5   HEMOGLOBIN g/dL 13.3   < > 13.8   HEMATOCRIT % 39.4   < > 42.2    < > = values in this interval not displayed.     No results found for: HGBA1C    Nutrition Diagnosis         Nutrition Dx Problem 1 No nutrition diagnosis at this time.     Nutrition Intervention         No nutrition intervention indicated at this time.      Monitor/Evaluation        Monitor monitor/eval: Per protocol       RD to follow up per protocol.    Electronically signed by:  Latanya Wolf RD  08/06/21 13:02 EDT

## 2021-08-07 LAB
ALBUMIN SERPL-MCNC: 3.5 G/DL (ref 3.5–5.2)
ALBUMIN/GLOB SERPL: 1.7 G/DL
ALP SERPL-CCNC: 44 U/L (ref 39–117)
ALT SERPL W P-5'-P-CCNC: 23 U/L (ref 1–33)
AMMONIA BLD-SCNC: 36 UMOL/L (ref 11–51)
ANION GAP SERPL CALCULATED.3IONS-SCNC: 9.2 MMOL/L (ref 5–15)
AST SERPL-CCNC: 57 U/L (ref 1–32)
BASOPHILS # BLD AUTO: 0.03 10*3/MM3 (ref 0–0.2)
BASOPHILS NFR BLD AUTO: 0.5 % (ref 0–1.5)
BILIRUB SERPL-MCNC: 0.3 MG/DL (ref 0–1.2)
BUN SERPL-MCNC: 5 MG/DL (ref 6–20)
BUN/CREAT SERPL: 8.1 (ref 7–25)
CALCIUM SPEC-SCNC: 7.8 MG/DL (ref 8.6–10.5)
CHLORIDE SERPL-SCNC: 106 MMOL/L (ref 98–107)
CO2 SERPL-SCNC: 21.8 MMOL/L (ref 22–29)
CREAT SERPL-MCNC: 0.62 MG/DL (ref 0.57–1)
DEPRECATED RDW RBC AUTO: 39.1 FL (ref 37–54)
EOSINOPHIL # BLD AUTO: 0.16 10*3/MM3 (ref 0–0.4)
EOSINOPHIL NFR BLD AUTO: 2.4 % (ref 0.3–6.2)
ERYTHROCYTE [DISTWIDTH] IN BLOOD BY AUTOMATED COUNT: 11.7 % (ref 12.3–15.4)
GFR SERPL CREATININE-BSD FRML MDRD: 106 ML/MIN/1.73
GLOBULIN UR ELPH-MCNC: 2.1 GM/DL
GLUCOSE BLDC GLUCOMTR-MCNC: 94 MG/DL (ref 70–99)
GLUCOSE SERPL-MCNC: 93 MG/DL (ref 65–99)
HCT VFR BLD AUTO: 33.3 % (ref 34–46.6)
HGB BLD-MCNC: 11.7 G/DL (ref 12–15.9)
IMM GRANULOCYTES # BLD AUTO: 0.02 10*3/MM3 (ref 0–0.05)
IMM GRANULOCYTES NFR BLD AUTO: 0.3 % (ref 0–0.5)
LYMPHOCYTES # BLD AUTO: 1.72 10*3/MM3 (ref 0.7–3.1)
LYMPHOCYTES NFR BLD AUTO: 25.9 % (ref 19.6–45.3)
MAGNESIUM SERPL-MCNC: 2 MG/DL (ref 1.6–2.6)
MCH RBC QN AUTO: 32.1 PG (ref 26.6–33)
MCHC RBC AUTO-ENTMCNC: 35.1 G/DL (ref 31.5–35.7)
MCV RBC AUTO: 91.5 FL (ref 79–97)
MONOCYTES # BLD AUTO: 0.56 10*3/MM3 (ref 0.1–0.9)
MONOCYTES NFR BLD AUTO: 8.4 % (ref 5–12)
NEUTROPHILS NFR BLD AUTO: 4.14 10*3/MM3 (ref 1.7–7)
NEUTROPHILS NFR BLD AUTO: 62.5 % (ref 42.7–76)
NRBC BLD AUTO-RTO: 0 /100 WBC (ref 0–0.2)
PHOSPHATE SERPL-MCNC: 2 MG/DL (ref 2.5–4.5)
PLATELET # BLD AUTO: 153 10*3/MM3 (ref 140–450)
PMV BLD AUTO: 9.3 FL (ref 6–12)
POTASSIUM SERPL-SCNC: 3.7 MMOL/L (ref 3.5–5.2)
PROCALCITONIN SERPL-MCNC: 0.06 NG/ML (ref 0–0.25)
PROT SERPL-MCNC: 5.6 G/DL (ref 6–8.5)
RBC # BLD AUTO: 3.64 10*6/MM3 (ref 3.77–5.28)
SODIUM SERPL-SCNC: 137 MMOL/L (ref 136–145)
WBC # BLD AUTO: 6.63 10*3/MM3 (ref 3.4–10.8)

## 2021-08-07 PROCEDURE — 99233 SBSQ HOSP IP/OBS HIGH 50: CPT | Performed by: INTERNAL MEDICINE

## 2021-08-07 PROCEDURE — 84145 PROCALCITONIN (PCT): CPT | Performed by: INTERNAL MEDICINE

## 2021-08-07 PROCEDURE — 84100 ASSAY OF PHOSPHORUS: CPT | Performed by: INTERNAL MEDICINE

## 2021-08-07 PROCEDURE — 80053 COMPREHEN METABOLIC PANEL: CPT | Performed by: INTERNAL MEDICINE

## 2021-08-07 PROCEDURE — 94760 N-INVAS EAR/PLS OXIMETRY 1: CPT

## 2021-08-07 PROCEDURE — 85025 COMPLETE CBC W/AUTO DIFF WBC: CPT | Performed by: INTERNAL MEDICINE

## 2021-08-07 PROCEDURE — 83735 ASSAY OF MAGNESIUM: CPT | Performed by: INTERNAL MEDICINE

## 2021-08-07 PROCEDURE — 82962 GLUCOSE BLOOD TEST: CPT

## 2021-08-07 PROCEDURE — 94799 UNLISTED PULMONARY SVC/PX: CPT

## 2021-08-07 PROCEDURE — 25010000002 PIPERACILLIN SOD-TAZOBACTAM PER 1 G: Performed by: INTERNAL MEDICINE

## 2021-08-07 PROCEDURE — 82140 ASSAY OF AMMONIA: CPT | Performed by: INTERNAL MEDICINE

## 2021-08-07 RX ORDER — BUSPIRONE HYDROCHLORIDE 15 MG/1
15 TABLET ORAL 2 TIMES DAILY
Status: DISCONTINUED | OUTPATIENT
Start: 2021-08-07 | End: 2021-08-08 | Stop reason: SDUPTHER

## 2021-08-07 RX ORDER — PRAZOSIN HYDROCHLORIDE 1 MG/1
1 CAPSULE ORAL NIGHTLY
Status: DISCONTINUED | OUTPATIENT
Start: 2021-08-07 | End: 2021-08-08 | Stop reason: SDUPTHER

## 2021-08-07 RX ORDER — SUMATRIPTAN 50 MG/1
50 TABLET, FILM COATED ORAL
Status: DISCONTINUED | OUTPATIENT
Start: 2021-08-07 | End: 2021-08-10 | Stop reason: HOSPADM

## 2021-08-07 RX ORDER — TRAZODONE HYDROCHLORIDE 100 MG/1
100 TABLET ORAL NIGHTLY PRN
Status: DISCONTINUED | OUTPATIENT
Start: 2021-08-07 | End: 2021-08-10 | Stop reason: HOSPADM

## 2021-08-07 RX ORDER — VENLAFAXINE HYDROCHLORIDE 75 MG/1
75 CAPSULE, EXTENDED RELEASE ORAL
Status: DISCONTINUED | OUTPATIENT
Start: 2021-08-08 | End: 2021-08-08 | Stop reason: SDUPTHER

## 2021-08-07 RX ORDER — CLONAZEPAM 0.5 MG/1
1 TABLET ORAL 2 TIMES DAILY
Status: DISCONTINUED | OUTPATIENT
Start: 2021-08-07 | End: 2021-08-08 | Stop reason: SDUPTHER

## 2021-08-07 RX ORDER — LORAZEPAM 0.5 MG/1
2 TABLET ORAL 3 TIMES DAILY PRN
Status: DISCONTINUED | OUTPATIENT
Start: 2021-08-07 | End: 2021-08-08

## 2021-08-07 RX ORDER — FENTANYL/ROPIVACAINE/NS/PF 2-625MCG/1
15 PLASTIC BAG, INJECTION (ML) EPIDURAL ONCE
Status: COMPLETED | OUTPATIENT
Start: 2021-08-07 | End: 2021-08-07

## 2021-08-07 RX ADMIN — TAZOBACTAM SODIUM AND PIPERACILLIN SODIUM 3.38 G: 375; 3 INJECTION, SOLUTION INTRAVENOUS at 21:32

## 2021-08-07 RX ADMIN — TAZOBACTAM SODIUM AND PIPERACILLIN SODIUM 3.38 G: 375; 3 INJECTION, SOLUTION INTRAVENOUS at 15:40

## 2021-08-07 RX ADMIN — PRAZOSIN HYDROCHLORIDE 1 MG: 1 CAPSULE ORAL at 21:32

## 2021-08-07 RX ADMIN — CLONAZEPAM 1 MG: 0.5 TABLET ORAL at 15:41

## 2021-08-07 RX ADMIN — POTASSIUM PHOSPHATE, MONOBASIC AND POTASSIUM PHOSPHATE, DIBASIC 15 MMOL: 224; 236 INJECTION, SOLUTION, CONCENTRATE INTRAVENOUS at 07:45

## 2021-08-07 RX ADMIN — SODIUM CHLORIDE, PRESERVATIVE FREE 10 ML: 5 INJECTION INTRAVENOUS at 09:52

## 2021-08-07 RX ADMIN — DOCUSATE SODIUM 50MG AND SENNOSIDES 8.6MG 2 TABLET: 8.6; 5 TABLET, FILM COATED ORAL at 21:32

## 2021-08-07 RX ADMIN — DOCUSATE SODIUM 50MG AND SENNOSIDES 8.6MG 2 TABLET: 8.6; 5 TABLET, FILM COATED ORAL at 09:42

## 2021-08-07 RX ADMIN — TAZOBACTAM SODIUM AND PIPERACILLIN SODIUM 3.38 G: 375; 3 INJECTION, SOLUTION INTRAVENOUS at 06:08

## 2021-08-07 RX ADMIN — SUMATRIPTAN SUCCINATE 50 MG: 50 TABLET, FILM COATED ORAL at 16:32

## 2021-08-07 RX ADMIN — BUSPIRONE HYDROCHLORIDE 15 MG: 15 TABLET ORAL at 21:32

## 2021-08-07 RX ADMIN — FAMOTIDINE 40 MG: 20 TABLET ORAL at 09:42

## 2021-08-07 RX ADMIN — SODIUM CHLORIDE, PRESERVATIVE FREE 10 ML: 5 INJECTION INTRAVENOUS at 21:32

## 2021-08-07 RX ADMIN — CLONAZEPAM 1 MG: 0.5 TABLET ORAL at 21:32

## 2021-08-07 RX ADMIN — Medication 1 TABLET: at 09:42

## 2021-08-07 NOTE — PLAN OF CARE
Goal Outcome Evaluation:  Plan of Care Reviewed With: patient        Progress: improving  Outcome Summary: VSS, maintained on room air this shift. Tolerating clear liquids well. Pt continues to have closewatch with her at all times. Tearful at times, discussed with this RN at length her circumstance. Emotional support and therapeutic listening provided.

## 2021-08-07 NOTE — PROGRESS NOTES
[facility]     Progress Note    Patient Name: Maddy Garcia  : 1980  MRN: [id number]  Primary Care Physician:  Jailene Mitchell APRN  Date of admission: 2021    Subjective   Subjective       Chief Complaint:   Follow-up for drug overdose, altered mental status, on mechanical ventilator.    Was extubated to nasal cannula.  Has had safety sitter at bedside.  Continued with IV Zosyn.  Continued with lactulose.    Overnight, no acute events.     This morning,  Safety sitter at bedside.  Serum ammonia is back to normal.  She is pleasant and answering all questions.  Has blistering wound in right foot, which per her report is worsening.  Has some redness and tenderness around it.  No fever noted.  Has no chest pain or chest tightness.  No nausea or vomiting.      Review of Systems  General:  Fatigue, No Fever  HEENT: No dysphagia, No Visual Changes, no rhinorrhea  Respiratory: No cough,No dyspnea, No phlegm, No Pleuritic Pain, no wheezing  Cardiovascular: No chest pain, no palpitations, No MILLER, No Chest Pressure  Gastrointestinal:  No Abdominal Pain, No Nausea, No Vomiting, No Diarrhea  Genitourinary:  No Dysuria, No Frequency, No Hesitancy  Musculoskeletal: No muscle pain or swelling  Endocrine:  No Heat Intolerance, No Cold Intolerance, Fatigue, right lower extremity with blistering wound and redness surrounding in the lateral dorsum of the foot  Hematologic:  No Bleeding, No Bruising  Psychiatric:   Anxiety, Depression  Neurologic:  No Confusion, no Dysarthria, No Headaches  Skin:  No Rash, No Open Wounds      Objective   Objective     Vitals:   Temp:  [99 °F (37.2 °C)-99.5 °F (37.5 °C)] 99.5 °F (37.5 °C)  Heart Rate:  [65-76] 67  Resp:  [16-22] 19  BP: ()/(44-72) 101/72  Physical Exam   Vital Signs Reviewed   Young female, pleasant, normal conversant   HEENT:  PERRL, EOMI.  OP, nares clear, no sinus tenderness, ET tube in place  Neck:  Supple, no JVD, no thyromegaly  Lymph: no axillary,  cervical, supraclavicular lymphadenopathy noted bilaterally  Chest:   Bilateral diminished breath sounds, no wheezing crackles or rhonchi, resonant to percussion bilaterally   CV: RRR, no MGR, pulses 2+, equal.  Abd:  Soft, NT, ND, + BS, no HSM  EXT:  no clubbing, no cyanosis, no edema, no joint tenderness,right lower extremity with blistering wound and redness surrounding in the lateral dorsum of the foot  Neuro:  A&Ox3, CN grossly intact, no focal deficit  Skin: No rashes or lesions noted       Result Review    Result Review:  I have personally reviewed the results from the time of this admission to 8/7/2021 07:19 EDT and agree with these findings:  [x]  Laboratory  [x]  Microbiology  [x]  Radiology  [x]  EKG/Telemetry   [x]  Cardiology/Vascular   []  Pathology  []  Old records  []  Other:  Most notable findings include: High serum ammonia, 90 yesterday.  EKG with normal QTC now.    Assessment/Plan   Assessment / Plan     Brief Patient Summary:  Maddy Garcia is a 41 y.o. female   Intentional drug overdose with Seroquel, Vyvanase  Depression, history of suicidal attempt  U tox positive for benzodiazepine, amphetamine and opiate  Hypokalemia  Altered mental status  Toxic metabolic encephalopathy     Active Hospital Problems:  Active Hospital Problems    Diagnosis    • Intentional overdose of drug in tablet form (CMS/[facility])      Plan:   Continue supplemental oxygen if needed.  EKG with normal QTC.  Discontinued sedatives.  Will need psychiatric evaluation.  Continue IV Zosyn.  Has worsening blistering on and right lower extremity.  Concern for cellulitis versus subcu infection/fasciitis.  Consider consulting podiatry.  Check sputum culture.  Repeat ammonia is normal.  Mentation is improved.  Can discontinue lactulose.  Replete electrolytes if needed.  PT/OT.  Can be out of bed to chair from pulmonary standpoint.    DVT prophylaxis:  Mechanical DVT prophylaxis orders are present.    CODE STATUS:   Code Status:  CPR  Medical Interventions (Level of Support Prior to Arrest): Full    Prognosis: Guarded     Reviewed labs, imaging and provider notes.  Discussed with Dr. Hickman and bedside nurse.    Electronically signed by Yassine Spencer MD, 8/7/2021, 07:19 EDT.

## 2021-08-07 NOTE — PROGRESS NOTES
[facility]   Hospitalist Progress Note  Date: 2021  Patient Name: Maddy Garcia  : 1980  MRN: [id number]  Date of admission: 2021      Subjective   Subjective     Chief Complaint: Intentional drug overdose    Summary: 41-year-old female with history of complicated psychiatric disorders, recently going through a divorce, under a great deal of stress, she tried to overdose on Seroquel, patient's father found her unresponsive, brought to the emergency room, intubated to protect her airway, critical care consulted. Remains intubated.       Interval Followup: Patient seen and evaluated.  Patient complains of a migraine headache.  Her nurse reports patient with a blister on her right foot.    Review of Systems   All systems were reviewed and negative except for: As noted in HPI    Objective   Objective     Vitals:   Temp:  [99 °F (37.2 °C)-99.5 °F (37.5 °C)] 99.5 °F (37.5 °C)  Heart Rate:  [65-74] 71  Resp:  [15-22] 15  BP: ()/(44-72) 91/54  Physical Exam    Constitutional: Awake, alert, no acute distress.     Eyes: Pupils equal, sclerae anicteric, no conjunctival injection   HENT: NCAT, mucous membranes moist   Neck: Supple, no thyromegaly, no lymphadenopathy, trachea midline   Respiratory: Clear to auscultation bilaterally, nonlabored respirations    Cardiovascular: RRR, no murmurs, rubs, or gallops, palpable pedal pulses bilaterally   Gastrointestinal: Positive bowel sounds, soft, nontender, nondistended   Musculoskeletal: No bilateral ankle edema, no clubbing or cyanosis to extremities   Psychiatric: Appropriate affect, cooperative   Neurologic: Oriented x 3, strength symmetric in all extremities, Cranial Nerves grossly intact to confrontation, speech clear   Skin: Right foot on the lateral aspect with a feeling blister which extends just below the fifth digit to the forefoot with some mild erythema.    Result Review    Result Review:  I have personally reviewed the results from the time of  this admission to 8/7/2021 09:50 EDT and agree with these findings:  [x]  Laboratory  [x]  Microbiology  []  Radiology  [x]  EKG/Telemetry   []  Cardiology/Vascular   []  Pathology  []  Old records  []  Other:    Assessment/Plan   Assessment / Plan     Assessment/Plan:    Assessment:  Intentional drug overdose with prescription medications  Depression with suicide attempt  Opioid use, and vitamin use, benzodiazepine use  Hypokalemia.  Repleted  Toxic encephalopathy  Unable to protect airway.  Resolved  Anxiety  Hyponatremia  Transaminitis.  Stable  Lactic acidosis.  Resolved  Hyperammonemia.  Resolved  Mycoplasma IgM positive  Right foot blister  Migraine headache  Anxiety disorder     Plan:  Labs and imaging reviewed  Continue Zosyn  Off Levophed maintaining blood pressures well.  Patient oxygenation well post extubation.  Telemetry monitoring to follow QTC  Safety sitter at bedside  Consult psych.    Discontinue lactulose  Add sumatriptan which patient takes at home for migraines.  Review home medications and resume as clinically indicated including patient's Ativan.  A.m. labs  Full code  VTE prophylaxis with SCDs  Clinical course will dictate further management  Discussed with patient      Discussed plan with RN.    DVT prophylaxis:  Mechanical DVT prophylaxis orders are present.    CODE STATUS:   Code Status: CPR  Medical Interventions (Level of Support Prior to Arrest): Full        Electronically signed by Vernell Weinstein MD, 08/07/21, 9:50 AM EDT.

## 2021-08-07 NOTE — PLAN OF CARE
Goal Outcome Evaluation:              Outcome Summary: Pt has been tearful throughout shift and worried about home life. VSS and pt has had no signs of acute distress. Staff has provided emotional support. PT completed personal hygiene with staff at bedside. Close watch still in place.  Paul Wills RN
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
## 2021-08-08 ENCOUNTER — APPOINTMENT (OUTPATIENT)
Dept: GENERAL RADIOLOGY | Facility: HOSPITAL | Age: 41
End: 2021-08-08

## 2021-08-08 PROBLEM — F43.12 CHRONIC POST-TRAUMATIC STRESS DISORDER (PTSD): Status: ACTIVE | Noted: 2021-08-08

## 2021-08-08 LAB
ALBUMIN SERPL-MCNC: 3.9 G/DL (ref 3.5–5.2)
ANION GAP SERPL CALCULATED.3IONS-SCNC: 9.1 MMOL/L (ref 5–15)
BASOPHILS # BLD AUTO: 0.04 10*3/MM3 (ref 0–0.2)
BASOPHILS NFR BLD AUTO: 0.6 % (ref 0–1.5)
BUN SERPL-MCNC: 3 MG/DL (ref 6–20)
BUN/CREAT SERPL: 4.8 (ref 7–25)
CALCIUM SPEC-SCNC: 8.8 MG/DL (ref 8.6–10.5)
CHLORIDE SERPL-SCNC: 107 MMOL/L (ref 98–107)
CO2 SERPL-SCNC: 23.9 MMOL/L (ref 22–29)
CREAT SERPL-MCNC: 0.63 MG/DL (ref 0.57–1)
DEPRECATED RDW RBC AUTO: 39 FL (ref 37–54)
EOSINOPHIL # BLD AUTO: 0.17 10*3/MM3 (ref 0–0.4)
EOSINOPHIL NFR BLD AUTO: 2.5 % (ref 0.3–6.2)
ERYTHROCYTE [DISTWIDTH] IN BLOOD BY AUTOMATED COUNT: 11.5 % (ref 12.3–15.4)
GFR SERPL CREATININE-BSD FRML MDRD: 104 ML/MIN/1.73
GLUCOSE BLDC GLUCOMTR-MCNC: 88 MG/DL (ref 70–99)
GLUCOSE SERPL-MCNC: 101 MG/DL (ref 65–99)
HCT VFR BLD AUTO: 37.2 % (ref 34–46.6)
HGB BLD-MCNC: 12.8 G/DL (ref 12–15.9)
IMM GRANULOCYTES # BLD AUTO: 0.02 10*3/MM3 (ref 0–0.05)
IMM GRANULOCYTES NFR BLD AUTO: 0.3 % (ref 0–0.5)
LYMPHOCYTES # BLD AUTO: 1.72 10*3/MM3 (ref 0.7–3.1)
LYMPHOCYTES NFR BLD AUTO: 25.1 % (ref 19.6–45.3)
MAGNESIUM SERPL-MCNC: 1.9 MG/DL (ref 1.6–2.6)
MCH RBC QN AUTO: 31.8 PG (ref 26.6–33)
MCHC RBC AUTO-ENTMCNC: 34.4 G/DL (ref 31.5–35.7)
MCV RBC AUTO: 92.3 FL (ref 79–97)
MONOCYTES # BLD AUTO: 0.52 10*3/MM3 (ref 0.1–0.9)
MONOCYTES NFR BLD AUTO: 7.6 % (ref 5–12)
NEUTROPHILS NFR BLD AUTO: 4.37 10*3/MM3 (ref 1.7–7)
NEUTROPHILS NFR BLD AUTO: 63.9 % (ref 42.7–76)
NRBC BLD AUTO-RTO: 0 /100 WBC (ref 0–0.2)
PHOSPHATE SERPL-MCNC: 2.5 MG/DL (ref 2.5–4.5)
PLATELET # BLD AUTO: 175 10*3/MM3 (ref 140–450)
PMV BLD AUTO: 9.9 FL (ref 6–12)
POTASSIUM SERPL-SCNC: 3.6 MMOL/L (ref 3.5–5.2)
QT INTERVAL: 418 MS
QT INTERVAL: 424 MS
QT INTERVAL: 456 MS
RBC # BLD AUTO: 4.03 10*6/MM3 (ref 3.77–5.28)
SODIUM SERPL-SCNC: 140 MMOL/L (ref 136–145)
WBC # BLD AUTO: 6.84 10*3/MM3 (ref 3.4–10.8)

## 2021-08-08 PROCEDURE — 80069 RENAL FUNCTION PANEL: CPT | Performed by: INTERNAL MEDICINE

## 2021-08-08 PROCEDURE — 85025 COMPLETE CBC W/AUTO DIFF WBC: CPT | Performed by: INTERNAL MEDICINE

## 2021-08-08 PROCEDURE — 94799 UNLISTED PULMONARY SVC/PX: CPT

## 2021-08-08 PROCEDURE — 99232 SBSQ HOSP IP/OBS MODERATE 35: CPT | Performed by: INTERNAL MEDICINE

## 2021-08-08 PROCEDURE — 82962 GLUCOSE BLOOD TEST: CPT

## 2021-08-08 PROCEDURE — 83735 ASSAY OF MAGNESIUM: CPT | Performed by: INTERNAL MEDICINE

## 2021-08-08 PROCEDURE — 25010000002 PIPERACILLIN SOD-TAZOBACTAM PER 1 G: Performed by: INTERNAL MEDICINE

## 2021-08-08 PROCEDURE — 71045 X-RAY EXAM CHEST 1 VIEW: CPT

## 2021-08-08 RX ORDER — BUSPIRONE HYDROCHLORIDE 15 MG/1
15 TABLET ORAL 2 TIMES DAILY
Status: DISCONTINUED | OUTPATIENT
Start: 2021-08-08 | End: 2021-08-10 | Stop reason: HOSPADM

## 2021-08-08 RX ORDER — CARBAMAZEPINE 200 MG/1
200 TABLET ORAL 2 TIMES DAILY
Status: DISCONTINUED | OUTPATIENT
Start: 2021-08-08 | End: 2021-08-10 | Stop reason: HOSPADM

## 2021-08-08 RX ORDER — PRAZOSIN HYDROCHLORIDE 1 MG/1
1 CAPSULE ORAL NIGHTLY
Status: DISCONTINUED | OUTPATIENT
Start: 2021-08-08 | End: 2021-08-10 | Stop reason: HOSPADM

## 2021-08-08 RX ORDER — LAMOTRIGINE 100 MG/1
100 TABLET ORAL 2 TIMES DAILY
Status: DISCONTINUED | OUTPATIENT
Start: 2021-08-08 | End: 2021-08-08 | Stop reason: SDUPTHER

## 2021-08-08 RX ORDER — OXYCODONE AND ACETAMINOPHEN 7.5; 325 MG/1; MG/1
1 TABLET ORAL EVERY 8 HOURS PRN
Status: DISCONTINUED | OUTPATIENT
Start: 2021-08-08 | End: 2021-08-10 | Stop reason: HOSPADM

## 2021-08-08 RX ORDER — LAMOTRIGINE 100 MG/1
100 TABLET ORAL 2 TIMES DAILY
Status: DISCONTINUED | OUTPATIENT
Start: 2021-08-08 | End: 2021-08-10 | Stop reason: HOSPADM

## 2021-08-08 RX ORDER — CARBAMAZEPINE 200 MG/1
200 TABLET ORAL 2 TIMES DAILY
Status: DISCONTINUED | OUTPATIENT
Start: 2021-08-08 | End: 2021-08-08 | Stop reason: SDUPTHER

## 2021-08-08 RX ORDER — VENLAFAXINE HYDROCHLORIDE 75 MG/1
75 CAPSULE, EXTENDED RELEASE ORAL
Status: DISCONTINUED | OUTPATIENT
Start: 2021-08-09 | End: 2021-08-10 | Stop reason: HOSPADM

## 2021-08-08 RX ORDER — CLONAZEPAM 0.5 MG/1
1 TABLET ORAL 2 TIMES DAILY
Status: DISCONTINUED | OUTPATIENT
Start: 2021-08-08 | End: 2021-08-10 | Stop reason: HOSPADM

## 2021-08-08 RX ORDER — CYCLOBENZAPRINE HCL 10 MG
10 TABLET ORAL 3 TIMES DAILY PRN
Status: DISCONTINUED | OUTPATIENT
Start: 2021-08-08 | End: 2021-08-10 | Stop reason: HOSPADM

## 2021-08-08 RX ADMIN — LAMOTRIGINE 100 MG: 100 TABLET ORAL at 22:39

## 2021-08-08 RX ADMIN — VENLAFAXINE HYDROCHLORIDE 75 MG: 75 CAPSULE, EXTENDED RELEASE ORAL at 08:22

## 2021-08-08 RX ADMIN — BUSPIRONE HYDROCHLORIDE 15 MG: 15 TABLET ORAL at 08:22

## 2021-08-08 RX ADMIN — CARBAMAZEPINE 200 MG: 200 TABLET ORAL at 10:38

## 2021-08-08 RX ADMIN — FAMOTIDINE 40 MG: 20 TABLET ORAL at 08:22

## 2021-08-08 RX ADMIN — TAZOBACTAM SODIUM AND PIPERACILLIN SODIUM 3.38 G: 375; 3 INJECTION, SOLUTION INTRAVENOUS at 06:08

## 2021-08-08 RX ADMIN — CARBAMAZEPINE 200 MG: 200 TABLET ORAL at 22:39

## 2021-08-08 RX ADMIN — Medication 1 TABLET: at 08:22

## 2021-08-08 RX ADMIN — TAZOBACTAM SODIUM AND PIPERACILLIN SODIUM 3.38 G: 375; 3 INJECTION, SOLUTION INTRAVENOUS at 22:39

## 2021-08-08 RX ADMIN — LORAZEPAM 2 MG: 2 TABLET ORAL at 01:38

## 2021-08-08 RX ADMIN — CLONAZEPAM 1 MG: 0.5 TABLET ORAL at 22:40

## 2021-08-08 RX ADMIN — SUMATRIPTAN SUCCINATE 50 MG: 50 TABLET, FILM COATED ORAL at 02:07

## 2021-08-08 RX ADMIN — CLONAZEPAM 1 MG: 0.5 TABLET ORAL at 08:22

## 2021-08-08 RX ADMIN — SUMATRIPTAN SUCCINATE 50 MG: 50 TABLET, FILM COATED ORAL at 23:29

## 2021-08-08 RX ADMIN — TAZOBACTAM SODIUM AND PIPERACILLIN SODIUM 3.38 G: 375; 3 INJECTION, SOLUTION INTRAVENOUS at 15:58

## 2021-08-08 RX ADMIN — TRAZODONE HYDROCHLORIDE 100 MG: 100 TABLET ORAL at 02:12

## 2021-08-08 RX ADMIN — PRAZOSIN HYDROCHLORIDE 1 MG: 1 CAPSULE ORAL at 22:39

## 2021-08-08 RX ADMIN — BUSPIRONE HYDROCHLORIDE 15 MG: 15 TABLET ORAL at 22:39

## 2021-08-08 RX ADMIN — DOCUSATE SODIUM 50MG AND SENNOSIDES 8.6MG 2 TABLET: 8.6; 5 TABLET, FILM COATED ORAL at 22:40

## 2021-08-08 RX ADMIN — CYCLOBENZAPRINE 10 MG: 10 TABLET, FILM COATED ORAL at 22:40

## 2021-08-08 RX ADMIN — SODIUM CHLORIDE, PRESERVATIVE FREE 10 ML: 5 INJECTION INTRAVENOUS at 08:23

## 2021-08-08 RX ADMIN — LAMOTRIGINE 100 MG: 100 TABLET ORAL at 10:39

## 2021-08-08 NOTE — THERAPY EVALUATION
Patient Name: Maddy Garcia  : 1980    MRN: [id number]                              Today's Date: 2021       Admit Date: 2021    Visit Dx:     ICD-10-CM ICD-9-CM   1. Intentional overdose of drug in tablet form (CMS/HCC)  T50.902A 977.9   2. Substance abuse (CMS/[facility])  F19.10 305.90   3. Somnolence  R40.0 780.09   4. Oropharyngeal dysphagia  R13.12 787.22   5. Decreased activities of daily living (ADL)  Z78.9 V49.89     Patient Active Problem List   Diagnosis   • Intentional overdose of drug in tablet form (CMS/[facility])     Past Medical History:   Diagnosis Date   • Anxiety    • Arthritis    • Bladder disorder    • Chest pain    • Chronic allergic rhinitis    • GERD (gastroesophageal reflux disease)    • Mood disorder (CMS/[facility])      Past Surgical History:   Procedure Laterality Date   • ABDOMINAL SURGERY     •  SECTION      HAD 3: ,,   • HYSTERECTOMY       General Information     Row Name 21          OT Time and Intention    Document Type  evaluation  -AC     Mode of Treatment  occupational therapy;individual therapy  -     Row Name 21          General Information    Patient Profile Reviewed  yes  -AC     Prior Level of Function  independent:  -AC     Existing Precautions/Restrictions  no known precautions/restrictions  -AC     Barriers to Rehab  none identified  -     Row Name 21          Occupational Profile    Reason for Services/Referral (Occupational Profile)  Pt. is a 41 year old female admitted for the above diagnosis. Pt. referred to OT services to assess independence with ADLs and adl transfers/fx'l mobility. No previous OT services for current condition.  -     Row Name 21          Living Environment    Lives With  parent(s)  -     Row Name 21          Cognition    Orientation Status (Cognition)  oriented x 3  -     Row Name 21          Safety Issues, Functional Mobility    Safety Issues Affecting  Function (Mobility)  -- na  -AC     Impairments Affecting Function (Mobility)  -- na  -AC       User Key  (r) = Recorded By, (t) = Taken By, (c) = Cosigned By    Initials Name Provider Type    Marcia Soria OT Occupational Therapist          Mobility/ADL's     Row Name 08/08/21 0838          Bed Mobility    Bed Mobility  bed mobility (all) activities  -     All Activities, [place] (Bed Mobility)  independent  -     Row Name 08/08/21 0838          Functional Mobility    Functional Mobility- Ind. Level  independent  -     Functional Mobility- Comment  patient was (I) for multiple steps in the room without assistive device. Patient did complain of discomfort with RLE foot  -     Row Name 08/08/21 0838          Activities of Daily Living    BADL Assessment/Intervention  -- patient is independent with upper body adls as well as lower body dressing/bathing, toileting  -       User Key  (r) = Recorded By, (t) = Taken By, (c) = Cosigned By    Initials Name Provider Type    Marcia Soria OT Occupational Therapist        Obj/Interventions     Row Name 08/08/21 0840          Sensory Assessment (Somatosensory)    Sensory Assessment (Somatosensory)  sensation intact  -     Row Name 08/08/21 0840          Vision Assessment/Intervention    Visual Impairment/Limitations  WNL  -     Row Name 08/08/21 0840          Range of Motion Comprehensive    General Range of Motion  bilateral upper extremity ROM WNL  -     Row Name 08/08/21 0840          Strength Comprehensive (MMT)    General Manual Muscle Testing (MMT) Assessment  no strength deficits identified  -     Row Name 08/08/21 0840          Motor Skills    Motor Skills  coordination;functional endurance  -     Coordination  WNL  -     Row Name 08/08/21 0840          Balance    Balance Assessment  standing dynamic balance  -     Dynamic Standing Balance  WFL  -       User Key  (r) = Recorded By, (t) = Taken By, (c) = Cosigned By    Initials Name  Provider Type    AC Marcia Dasilva OT Occupational Therapist        Goals/Plan    No documentation.       Clinical Impression     Row Name 08/08/21 0841          Pain Assessment    Additional Documentation  Pain Scale: FACES Pre/Post-Treatment (Group)  -AC     Row Name 08/08/21 0841          Pain Scale: FACES Pre/Post-Treatment    Pain: FACES Scale, Pretreatment  2-->hurts little bit  -AC     Posttreatment Pain Rating  2-->hurts little bit  -AC     Pain Location  foot  -AC     Row Name 08/08/21 0841          Plan of Care Review    Plan of Care Reviewed With  patient  -AC     Progress  no change  -AC     Outcome Summary  Patient not appropriate for skilled OT services at this time as patient has not had a decline in ADLs or ADL transfers/fx'l mobility. patient safe to d/c to previous setting when medically appropriate. d/c occupational therapy services.  -     Row Name 08/08/21 0841          Therapy Assessment/Plan (OT)    Patient/Family Therapy Goal Statement (OT)  NA  -AC     Criteria for Skilled Therapeutic Interventions Met (OT)  no problems identified which require skilled intervention  -AC     Therapy Frequency (OT)  evaluation only  -     Row Name 08/08/21 0841          Therapy Plan Review/Discharge Plan (OT)    Anticipated Discharge Disposition (OT)  home  -       User Key  (r) = Recorded By, (t) = Taken By, (c) = Cosigned By    Initials Name Provider Type    Marcia Soria OT Occupational Therapist        Outcome Measures     Row Name 08/08/21 0843          How much help from another is currently needed...    Putting on and taking off regular lower body clothing?  4  -AC     Bathing (including washing, rinsing, and drying)  4  -AC     Toileting (which includes using toilet bed pan or urinal)  4  -AC     Putting on and taking off regular upper body clothing  4  -AC     Taking care of personal grooming (such as brushing teeth)  4  -AC     Eating meals  4  -AC     AM-PAC 6 Clicks Score (OT)  24  -AC      Row Name 08/08/21 0843          Functional Assessment    Outcome Measure Options  AM-PAC 6 Clicks Daily Activity (OT);Optimal Instrument  -AC     Row Name 08/08/21 0843          Optimal Instrument    Optimal Instrument  Optimal - 3  -AC     Bending/Stooping  1  -AC     Standing  1  -AC     Reaching  1  -AC     From the list, choose the 3 activities you would most like to be able to do without any difficulty  Bending/stooping;Standing;Reaching  -AC     Total Score Optimal - 3  3  -AC       User Key  (r) = Recorded By, (t) = Taken By, (c) = Cosigned By    Initials Name Provider Type    Marcia Soria OT Occupational Therapist            OT Recommendation and Plan  Therapy Frequency (OT): evaluation only  Plan of Care Review  Plan of Care Reviewed With: patient  Progress: no change  Outcome Summary: Patient not appropriate for skilled OT services at this time as patient has not had a decline in ADLs or ADL transfers/fx'l mobility. patient safe to d/c to previous setting when medically appropriate. d/c occupational therapy services.     Time Calculation:   Time Calculation- OT     Row Name 08/08/21 0848             Time Calculation- OT    OT Received On  08/08/21  -AC         Untimed Charges    OT Eval/Re-eval Minutes  25  -AC         Total Minutes    Untimed Charges Total Minutes  25  -AC       Total Minutes  25  -AC        User Key  (r) = Recorded By, (t) = Taken By, (c) = Cosigned By    Initials Name Provider Type    Marcia Soria OT Occupational Therapist                 Marcia Dasilva OT  8/8/2021

## 2021-08-08 NOTE — PROGRESS NOTES
"      Inpatient Psych Progress Note     Clinician:   Admission Date: 8/4/2021  14:14 EDT 08/08/21        Allergies  Allergies   Allergen Reactions   • Sulfa Antibiotics Rash       Hospital Day: 4 days      History  CC/clinical focus: Depression status post suicide attempt    Interval HPI: Patient seen and evaluated by me.  Chart reviewed.  Patient continues to be a little bit disorganized.  She continues to be somewhat inattentive and rambling.  However this appears to be more from her history of ADD.  She is attentive to the interview and cooperative.    Patient looks a little better than she did yesterday however his conversation goes on she gets more upset and is tearful.  She continues to report ruminations and feeling like her thoughts are continuous and cannot stop.  Patient did not sleep well last night.  She took trazodone about 2 AM.  Patient continues to ruminate on her social situation possibility living or children.  She reports lots of stressors with multiple people \"yelling at me\" and reports family is not happy with her, her  is unhappy with her, and overall feeling upset and agitated over her current situation.    She recognizes and is more forthright today about her overdose.  She does acknowledge it was an attempt to harm her self.  However she is denying it at this time.  She continues to be very labile.  She reports and does acknowledge that she was feeling hopeless.  Patient continues to be distraught, gets tearful, and exhibits mood lability.    Spoke with the patient's mother mom reports the patient is always very anxious.  They are very concerned about her attempt in coming home because of the significant mental stressors she is facing.    Patient again begins talking about cell phones being hacked and tells me it is not due to paranoia.  Condition goes on talking about her estranged  having people follow her and she describes writing down license plate numbers of cars that she " "felt were following her.  She describes stopping in going up to police officers to talk to him about this occurring.  Patient with some bizarre thinking and appears to be delusional.    Interval Review of Systems:   General ROS: negative       /61 (BP Location: Left arm, Patient Position: Sitting)   Pulse 77   Temp 97.9 °F (36.6 °C) (Oral)   Resp 16   Ht 170.2 cm (67\")   Wt 61.5 kg (135 lb 9.3 oz)   SpO2 100%   BMI 21.24 kg/m²     Mental Status Exam  Mood: anxious, dysphoric and sad  Affect: dysphoric, labile and Tearful   Thought Processes: linear, logical, and goal directed and Minimizing  Thought Content: Possible delusional thinking  Hallucinations: no  Suicidal Thoughts: denies  Suicidal Plan/Intent: denies  Hopelesness:Mild  Homicidal Thoughts:  denies      Medical Decision Making:   Labs:     Lab Results (last 24 hours)     Procedure Component Value Units Date/Time    Renal Function Panel [[id number]]  (Abnormal) Collected: 08/08/21 0340    Specimen: Blood Updated: 08/08/21 0510     Glucose 101 mg/dL      BUN 3 mg/dL      Creatinine 0.63 mg/dL      Sodium 140 mmol/L      Potassium 3.6 mmol/L      Chloride 107 mmol/L      CO2 23.9 mmol/L      Calcium 8.8 mg/dL      Albumin 3.90 g/dL      Phosphorus 2.5 mg/dL      Anion Gap 9.1 mmol/L      BUN/Creatinine Ratio 4.8     eGFR Non African Amer 104 mL/min/1.73     Narrative:      GFR Normal >60  Chronic Kidney Disease <60  Kidney Failure <15      Magnesium [[id number]]  (Normal) Collected: 08/08/21 0340    Specimen: Blood Updated: 08/08/21 0450     Magnesium 1.9 mg/dL     CBC & Differential [[id number]]  (Abnormal) Collected: 08/08/21 0340    Specimen: Blood Updated: 08/08/21 0432    Narrative:      The following orders were created for panel order CBC & Differential.  Procedure                               Abnormality         Status                     ---------                               -----------         ------                     CBC Auto " Differential[[id number]]        Abnormal            Final result                 Please view results for these tests on the individual orders.    CBC Auto Differential [[id number]]  (Abnormal) Collected: 08/08/21 0340    Specimen: Blood Updated: 08/08/21 0432     WBC 6.84 10*3/mm3      RBC 4.03 10*6/mm3      Hemoglobin 12.8 g/dL      Hematocrit 37.2 %      MCV 92.3 fL      MCH 31.8 pg      MCHC 34.4 g/dL      RDW 11.5 %      RDW-SD 39.0 fl      MPV 9.9 fL      Platelets 175 10*3/mm3      Neutrophil % 63.9 %      Lymphocyte % 25.1 %      Monocyte % 7.6 %      Eosinophil % 2.5 %      Basophil % 0.6 %      Immature Grans % 0.3 %      Neutrophils, Absolute 4.37 10*3/mm3      Lymphocytes, Absolute 1.72 10*3/mm3      Monocytes, Absolute 0.52 10*3/mm3      Eosinophils, Absolute 0.17 10*3/mm3      Basophils, Absolute 0.04 10*3/mm3      Immature Grans, Absolute 0.02 10*3/mm3      nRBC 0.0 /100 WBC     Blood Culture - Blood, Arm, Left [[id number]] Collected: 08/05/21 0050    Specimen: Blood from Arm, Left Updated: 08/08/21 0115     Blood Culture No growth at 3 days    Blood Culture - Blood, Arm, Right [[id number]] Collected: 08/05/21 0050    Specimen: Blood from Arm, Right Updated: 08/08/21 0115     Blood Culture No growth at 3 days            Radiology:     Imaging Results (Last 24 Hours)     ** No results found for the last 24 hours. **            EKG:     ECG/EMG Results (most recent)     Procedure Component Value Units Date/Time    ECG 12 Lead [[id number]] Collected: 08/04/21 1615     Updated: 08/04/21 1616     QT Interval 424 ms     Narrative:      HEART RATE= 60  bpm  RR Interval= 1008  ms  NJ Interval= 209  ms  P Horizontal Axis= -9  deg  P Front Axis= 58  deg  QRSD Interval= 93  ms  QT Interval= 424  ms  QRS Axis= 63  deg  T Wave Axis= 58  deg  - BORDERLINE ECG -  Sinus rhythm  Borderline prolonged NJ interval  Low voltage, extremity and precordial leads  When compared with ECG of 13-Oct-2020 10:04:34,  New conduction  abnormality  Electronically Signed By:   Date and Time of Study: 2021-08-04 16:15:33    ECG 12 Lead [[id number]] Collected: 08/04/21 2146     Updated: 08/04/21 2147     QT Interval 456 ms     Narrative:      HEART RATE= 51  bpm  RR Interval= 1176  ms  NM Interval= 124  ms  P Horizontal Axis= 223  deg  P Front Axis= -87  deg  QRSD Interval= 90  ms  QT Interval= 456  ms  QRS Axis= 71  deg  T Wave Axis= 70  deg  - OTHERWISE NORMAL ECG -  Sinus or ectopic atrial rhythm  Low voltage, precordial leads  Electronically Signed By:   Date and Time of Study: 2021-08-04 21:46:15    ECG 12 Lead [[id number]] Collected: 08/05/21 0422     Updated: 08/05/21 0423     QT Interval 418 ms     Narrative:      HEART RATE= 69  bpm  RR Interval= 872  ms  NM Interval= 191  ms  P Horizontal Axis= 4  deg  P Front Axis= 71  deg  QRSD Interval= 92  ms  QT Interval= 418  ms  QRS Axis= 77  deg  T Wave Axis= 57  deg  - OTHERWISE NORMAL ECG -  Sinus rhythm  Low voltage, extremity leads  When compared with ECG of 04-Aug-2021 21:46:15,  Significant change in rhythm  Electronically Signed By:   Date and Time of Study: 2021-08-05 04:22:09           Medications:  busPIRone, 15 mg, Oral, BID  carBAMazepine, 200 mg, Oral, BID  clonazePAM, 1 mg, Oral, BID  famotidine, 40 mg, Oral, Daily  lamoTRIgine, 100 mg, Oral, BID  multivitamin with minerals, 1 tablet, Oral, Daily  piperacillin-tazobactam, 3.375 g, Intravenous, Q8H  prazosin, 1 mg, Oral, Nightly  senna-docusate sodium, 2 tablet, Oral, BID  sodium chloride, 10 mL, Intravenous, Q12H  venlafaxine XR, 75 mg, Oral, Daily With Breakfast           All medications reviewed.      Assessment:   Patient Active Problem List   Diagnosis Code   • Intentional overdose of drug in tablet form (CMS/[facility]) T50.902A             Plan:      1) discussed case with Dr. Kath Peters and will transfer to [facility] for further evaluation and mood stabilization  2) continue current treatment protocol will add aripiprazole for mood  stabilization and possible psychosis underlying  3) patient does not want to come to [facility] and will initiate 72-hour hold.  4) we will transfer [facility]    Continue hospitalization for safety and stabilization.  Continue current level of Special Precautions (q15 minute checks).

## 2021-08-08 NOTE — PROGRESS NOTES
[facility]     Progress Note    Patient Name: Maddy Garcia  : 1980  MRN: [id number]  Primary Care Physician:  Jailene Mitchell APRN  Date of admission: 2021    Subjective   Subjective       Chief Complaint:   Follow-up for drug overdose, altered mental status, on mechanical ventilator.    Continued with antibiotics.  Stop lactulose.  Transferred out of ICU.    Overnight, no acute events.     This morning,  Safety sitter at bedside.  Psychiatry evaluation done this morning.  Complaining of cough.  Serum ammonia is back to normal.  She is pleasant and answering all questions.  Blistering wound right foot is stable.  No fever noted.  Has no chest pain or chest tightness.  No nausea or vomiting.      Review of Systems  General:  Fatigue, No Fever  HEENT: No dysphagia, No Visual Changes, no rhinorrhea  Respiratory: No cough,No dyspnea, No phlegm, No Pleuritic Pain, no wheezing  Cardiovascular: No chest pain, no palpitations, No MILLER, No Chest Pressure  Gastrointestinal:  No Abdominal Pain, No Nausea, No Vomiting, No Diarrhea  Neurologic:  No Confusion, no Dysarthria, No Headaches  Skin:  No Rash, No Open Wounds      Objective   Objective     Vitals:   Temp:  [97.8 °F (36.6 °C)-99 °F (37.2 °C)] 97.9 °F (36.6 °C)  Heart Rate:  [63-84] 77  Resp:  [12-26] 16  BP: (104-138)/(53-96) 109/64  Physical Exam   Vital Signs Reviewed   Young female, pleasant, normal conversant   HEENT:  PERRL, EOMI.  OP, nares clear, no sinus tenderness, ET tube in place  Neck:  Supple, no JVD, no thyromegaly  Lymph: no axillary, cervical, supraclavicular lymphadenopathy noted bilaterally  Chest:   Bilateral diminished breath sounds, no wheezing crackles or rhonchi, resonant to percussion bilaterally   CV: RRR, no MGR, pulses 2+, equal.  Abd:  Soft, NT, ND, + BS, no HSM  EXT:  no clubbing, no cyanosis, no edema, no joint tenderness,right lower extremity with blistering wound and redness surrounding in the lateral dorsum of the  foot stable  Neuro:  A&Ox3, CN grossly intact, no focal deficit  Skin: No rashes or lesions noted       Result Review    Result Review:  I have personally reviewed the results from the time of this admission to 8/8/2021 10:12 EDT and agree with these findings:  [x]  Laboratory  [x]  Microbiology  [x]  Radiology  [x]  EKG/Telemetry   [x]  Cardiology/Vascular   []  Pathology  []  Old records  []  Other:  Most notable findings include: High serum ammonia, 90 yesterday.  EKG with normal QTC now.    Assessment/Plan   Assessment / Plan     Brief Patient Summary:  Maddy Garcia is a 41 y.o. female   Intentional drug overdose with Seroquel, Vyvanase  Depression, history of suicidal attempt  U tox positive for benzodiazepine, amphetamine and opiate  Hypokalemia  Altered mental status  Toxic metabolic encephalopathy     Active Hospital Problems:  Active Hospital Problems    Diagnosis    • Intentional overdose of drug in tablet form (CMS/[facility])      Plan:   Continue supplemental oxygen if needed.  Check chest x-ray.  May need IV fluids.  Has had soft blood pressure.  Dr. Banks  with psych evaluating.  Continue IV Zosyn.  Has worsening blistering on and right lower extremity.  Concern for cellulitis versus subcu infection/fasciitis.  Consider consulting podiatry.  Check sputum culture.  Replete electrolytes if needed.  PT/OT.  Can be out of bed to chair from pulmonary standpoint.    DVT prophylaxis:  Mechanical DVT prophylaxis orders are present.    CODE STATUS:   Code Status: CPR  Medical Interventions (Level of Support Prior to Arrest): Full    Prognosis: Guarded     Reviewed labs, imaging and provider notes.  Discussed with Dr. Hickman and bedside nurse.    Electronically signed by Yassine Spencer MD, 8/8/2021, 10:12 EDT.

## 2021-08-08 NOTE — PROGRESS NOTES
[facility]   Hospitalist Progress Note  Date: 2021  Patient Name: Maddy Garcia  : 1980  MRN: [id number]  Date of admission: 2021      Subjective   Subjective     Chief Complaint: Intentional drug overdose    Summary: 41-year-old female with history of complicated psychiatric disorders, recently going through a divorce, under a great deal of stress, she tried to overdose on Seroquel, patient's father found her unresponsive, brought to the emergency room, intubated to protect her airway, critical care consulted. Remains intubated.       Interval Followup: Patient seen and evaluated.  Reports that the pain and swelling in her right foot seem to be a little better this morning.  Review of Systems   All systems were reviewed and negative except for: As noted in HPI    Objective   Objective     Vitals:   Temp:  [97.8 °F (36.6 °C)-99 °F (37.2 °C)] 97.9 °F (36.6 °C)  Heart Rate:  [63-84] 67  Resp:  [12-26] 16  BP: (101-138)/(53-96) 109/64  Physical Exam    Constitutional: Awake, alert, no acute distress.     Eyes: Pupils equal, sclerae anicteric, no conjunctival injection   HENT: NCAT, mucous membranes moist   Neck: Supple, no thyromegaly, no lymphadenopathy, trachea midline   Respiratory: Clear to auscultation bilaterally, nonlabored respirations    Cardiovascular: RRR, no murmurs, rubs, or gallops, palpable pedal pulses bilaterally   Gastrointestinal: Positive bowel sounds, soft, nontender, nondistended   Musculoskeletal: No bilateral ankle edema, no clubbing or cyanosis to extremities   Psychiatric: Appropriate affect, cooperative   Neurologic: Oriented x 3, strength symmetric in all extremities, Cranial Nerves grossly intact to confrontation, speech clear   Skin: Right foot on the lateral aspect with a blister which extends just below the fifth digit to the forefoot with some mild erythema.  There is recession of the erythema from the previously marked borders.    Result Review    Result  Review:  I have personally reviewed the results from the time of this admission to 8/8/2021 08:42 EDT and agree with these findings:  [x]  Laboratory  [x]  Microbiology  []  Radiology  [x]  EKG/Telemetry   []  Cardiology/Vascular   []  Pathology  []  Old records  []  Other:    Assessment/Plan   Assessment / Plan     Assessment/Plan:  Assessment:  Intentional drug overdose with prescription medications  Depression with suicide attempt  Opioid use, and vitamin use, benzodiazepine use  Hypokalemia.  Repleted  Toxic encephalopathy  Unable to protect airway.  Resolved  Anxiety  Hyponatremia  Transaminitis.  Stable  Lactic acidosis.  Resolved  Hyperammonemia.  Resolved  Mycoplasma IgM positive  Right foot blister  Migraine headache  Anxiety disorder     Plan:  Labs and imaging reviewed  Continue Zosyn  Off Levophed maintaining blood pressures well.  Patient oxygenation well post extubation.  Telemetry monitoring to follow QTC  Safety sitter at bedside  Psych consult  Continue sumatriptan which patient takes at home for migraines.  Review home medications and resume as clinically indicated including patient's Ativan.  Will consult Podiatry  A.m. labs  Full code  VTE prophylaxis with SCDs  Clinical course will dictate further management  Discussed with patient      Discussed plan with RN.    DVT prophylaxis:  Mechanical DVT prophylaxis orders are present.    CODE STATUS:   Code Status: CPR  Medical Interventions (Level of Support Prior to Arrest): Full        Electronically signed by Vernell Weinstein MD, 08/08/21, 8:42 AM EDT.

## 2021-08-09 PROCEDURE — 25010000002 PIPERACILLIN SOD-TAZOBACTAM PER 1 G: Performed by: INTERNAL MEDICINE

## 2021-08-09 PROCEDURE — 99232 SBSQ HOSP IP/OBS MODERATE 35: CPT | Performed by: INTERNAL MEDICINE

## 2021-08-09 PROCEDURE — 94799 UNLISTED PULMONARY SVC/PX: CPT

## 2021-08-09 PROCEDURE — 92526 ORAL FUNCTION THERAPY: CPT

## 2021-08-09 RX ORDER — DOXYCYCLINE 100 MG/1
100 CAPSULE ORAL EVERY 12 HOURS SCHEDULED
Status: DISCONTINUED | OUTPATIENT
Start: 2021-08-09 | End: 2021-08-10 | Stop reason: HOSPADM

## 2021-08-09 RX ORDER — ARIPIPRAZOLE 5 MG/1
5 TABLET ORAL DAILY
Status: DISCONTINUED | OUTPATIENT
Start: 2021-08-09 | End: 2021-08-10 | Stop reason: HOSPADM

## 2021-08-09 RX ADMIN — BUSPIRONE HYDROCHLORIDE 15 MG: 15 TABLET ORAL at 21:03

## 2021-08-09 RX ADMIN — LAMOTRIGINE 100 MG: 100 TABLET ORAL at 09:34

## 2021-08-09 RX ADMIN — CARBAMAZEPINE 200 MG: 200 TABLET ORAL at 09:35

## 2021-08-09 RX ADMIN — OXYCODONE HYDROCHLORIDE AND ACETAMINOPHEN 1 TABLET: 7.5; 325 TABLET ORAL at 17:02

## 2021-08-09 RX ADMIN — CLONAZEPAM 1 MG: 0.5 TABLET ORAL at 09:34

## 2021-08-09 RX ADMIN — Medication 5 MG: at 21:03

## 2021-08-09 RX ADMIN — FAMOTIDINE 40 MG: 20 TABLET ORAL at 09:35

## 2021-08-09 RX ADMIN — LAMOTRIGINE 100 MG: 100 TABLET ORAL at 21:03

## 2021-08-09 RX ADMIN — BUSPIRONE HYDROCHLORIDE 15 MG: 15 TABLET ORAL at 09:34

## 2021-08-09 RX ADMIN — DOXYCYCLINE 100 MG: 100 CAPSULE ORAL at 21:03

## 2021-08-09 RX ADMIN — TAZOBACTAM SODIUM AND PIPERACILLIN SODIUM 3.38 G: 375; 3 INJECTION, SOLUTION INTRAVENOUS at 06:09

## 2021-08-09 RX ADMIN — CARBAMAZEPINE 200 MG: 200 TABLET ORAL at 21:03

## 2021-08-09 RX ADMIN — DOCUSATE SODIUM 50MG AND SENNOSIDES 8.6MG 2 TABLET: 8.6; 5 TABLET, FILM COATED ORAL at 09:33

## 2021-08-09 RX ADMIN — PRAZOSIN HYDROCHLORIDE 1 MG: 1 CAPSULE ORAL at 21:03

## 2021-08-09 RX ADMIN — CLONAZEPAM 1 MG: 0.5 TABLET ORAL at 21:03

## 2021-08-09 RX ADMIN — Medication 1 TABLET: at 09:33

## 2021-08-09 RX ADMIN — SODIUM CHLORIDE, PRESERVATIVE FREE 10 ML: 5 INJECTION INTRAVENOUS at 09:36

## 2021-08-09 RX ADMIN — VENLAFAXINE HYDROCHLORIDE 75 MG: 75 CAPSULE, EXTENDED RELEASE ORAL at 09:36

## 2021-08-09 RX ADMIN — DOXYCYCLINE 100 MG: 100 CAPSULE ORAL at 16:59

## 2021-08-09 RX ADMIN — ARIPIPRAZOLE 5 MG: 5 TABLET ORAL at 15:04

## 2021-08-09 RX ADMIN — SODIUM CHLORIDE, PRESERVATIVE FREE 10 ML: 5 INJECTION INTRAVENOUS at 21:02

## 2021-08-09 NOTE — PLAN OF CARE
Goal Outcome Evaluation:  Plan of Care Reviewed With: patient        Progress: no change  Outcome Summary: Pt states she does not want or need to go to [facility] unit.  No significant changes otherwise.  Verbalizes contract for safety. Jacy Solorzano RN

## 2021-08-09 NOTE — PROGRESS NOTES
[facility]   Hospitalist Progress Note  Date: 8/9/2021  Patient Name: Maddy Garcia    Subjective   Subjective     Chief Complaint:   Intentional drug overdose    Summary:   41-year-old female with history of complicated psychiatric disorders, recently going through a divorce, under a great deal of stress, she tried to overdose on Seroquel, patient's father found her unresponsive, brought to the emergency room, intubated to protect her airway, critical care consulted. Extubated, concern for right foot infection.      Interval Followup:   Patient wants to go home, right foot swelling improved but still present with overlying blister.  Patient believes it is from hitting her foot on the bed while in the ICU.      Review of Systems   No CP, no NVD    Objective   Objective     Vitals:   Temp:  [97.5 °F (36.4 °C)-98.1 °F (36.7 °C)] 97.5 °F (36.4 °C)  Heart Rate:  [69-75] 74  Resp:  [14-18] 14  BP: (104-134)/(49-87) 117/71  Physical Exam   Gen: NAD, WDWN female sitting up in bed eating breakfast   HEENT: NCAT, mmm  Resp: CTAB no wrr, no dyspnea  CV: RRR no mrg, no LE edema  GI: Abdomen soft NT, ND +bs  Psych: AOx3, tangential  Right foot: swelling decreased, bullae most lateral aspect of foot nontender to palpation      Result Review    Result Review:  I have personally reviewed the results from the time of this admission to 8/9/2021 17:16 EDT and agree with these findings:  [x]  Laboratory   Creatinine 0.6  [x]  Microbiology  Mycoplasma IgM positive  [x]  Radiology  Chest x ray: no active disease  [x]  EKG/Telemetry   Personally reviewed telemetry: sinus 100  []  Cardiology/Vascular   []  Pathology  []  Old records  []  Other:    Assessment/Plan   Assessment / Plan     Assessment/Plan:  Intentional drug overdose with prescription medications  Depression with suicide attempt  Opioid use, and vitamin use, benzodiazepine use  Hypokalemia.  Repleted  Toxic encephalopathy  Unable to protect airway.   Resolved  Anxiety  Hyponatremia  Transaminitis.  Stable  Lactic acidosis.  Resolved  Hyperammonemia.  Resolved  Mycoplasma IgM positive  Right foot blister  Migraine headache  Anxiety disorder     Plan:  Dc tele  Change to PO doxy for foot and Mycoplasma (no symptoms)  Off of 72 hour hold per psych  Planning for DC in AM      DVT ppx: SCDs  Diet: regular  Discussed with bedside RN     CODE STATUS:   Code Status: CPR  Medical Interventions (Level of Support Prior to Arrest): Full

## 2021-08-09 NOTE — PROGRESS NOTES
Maddy Garcia  41 y.o.  423/1  08/09/21  Marine Tinajero MD    Subjective      Discussed with nurse and she reports the patient is talkative.  Patient is been denying suicidal ideation with staff.  Denies any plan to do anything to harm her self.  She has voiced a plan to go home with parents and have dad stay with her for her to stay with parents.  She also reports that she is well living with parents manage her medications.  She is calm and pleasant with staff no acute agitation.    Patient day is much brighter.  She is up and has showered and washed her hair.  Her affect is brighter.  She is engaging cooperative and very talkative.  She makes good eye contact.  She reports she is talked to family on the phone.  She also reports that she needs to get out here so she can go meet with her  and make a strategy of her custody case.  She reports that she realizes her overdose was a toll on her body and especially her parents and is remorseful that she is taken the overdose.  She is denying suicidal ideations at this time.  She reports that she slept very well.    Patient is [place] and goal-directed and talking about follow-up care.  She asked if I see patients outside the hospital, which I do not, but is very interested in continuing with her therapist as well as seeing a psychiatrist for continued medication management.  She also expresses hope that she can work with her  to maintain custody or at least joint custody of her children.  Her affect is appropriate.  She is engaging.  No agitation or restlessness.  Significantly improved affect.    Objective     Vitals:    08/09/21 1100   BP: 117/64   Pulse: 73   Resp: 16   Temp: 98.1 °F (36.7 °C)   SpO2: 100%           Current Facility-Administered Medications:   •  sennosides-docusate (PERICOLACE) 8.6-50 MG per tablet 2 tablet, 2 tablet, Oral, BID, 2 tablet at 08/09/21 0933 **AND** polyethylene glycol (MIRALAX) packet 17 g, 17 g, Oral, Daily PRN  **AND** bisacodyl (DULCOLAX) EC tablet 5 mg, 5 mg, Oral, Daily PRN **AND** bisacodyl (DULCOLAX) suppository 10 mg, 10 mg, Rectal, Daily PRN, Darci Salgado MD  •  busPIRone (BUSPAR) tablet 15 mg, 15 mg, Oral, BID, John, Micheline, 15 mg at 08/09/21 0934  •  carBAMazepine (TEGretol) tablet 200 mg, 200 mg, Oral, BID, John, Micheline, 200 mg at 08/09/21 0935  •  clonazePAM (KlonoPIN) tablet 1 mg, 1 mg, Oral, BID, John, Micheline, 1 mg at 08/09/21 0934  •  cyclobenzaprine (FLEXERIL) tablet 10 mg, 10 mg, Oral, TID PRN, Vernell Weinstein MD, 10 mg at 08/08/21 2240  •  famotidine (PEPCID) tablet 40 mg, 40 mg, Oral, Daily, Darci Salgado MD, 40 mg at 08/09/21 0935  •  lamoTRIgine (LaMICtal) tablet 100 mg, 100 mg, Oral, BID, John, Micheline, 100 mg at 08/09/21 0934  •  melatonin tablet 5 mg, 5 mg, Oral, Nightly PRN, Darci Salgado MD, 5 mg at 08/06/21 2127  •  multivitamin with minerals 1 tablet, 1 tablet, Oral, Daily, Darci Salgado MD, 1 tablet at 08/09/21 0933  •  ondansetron (ZOFRAN) injection 4 mg, 4 mg, Intravenous, Q6H PRN, Darci Salgado MD  •  oxyCODONE-acetaminophen (PERCOCET) 7.5-325 MG per tablet 1 tablet, 1 tablet, Oral, Q8H PRN, Vernell Weinstein MD  •  Pharmacy to Dose Zosyn, , Does not apply, Continuous PRN, Darci Salgado MD  •  phenol (CHLORASEPTIC) 1.4 % liquid 3 spray, 3 spray, Mouth/Throat, Q2H PRN, Vernell Weinstein MD  •  piperacillin-tazobactam (ZOSYN) 3.375 g in iso-osmotic dextrose 50 ml (premix), 3.375 g, Intravenous, Q8H, Darci Salgado MD, 3.375 g at 08/09/21 0609  •  prazosin (MINIPRESS) capsule 1 mg, 1 mg, Oral, Nightly, Micheline Lopez, 1 mg at 08/08/21 2239  •  sodium chloride 0.9 % flush 10 mL, 10 mL, Intravenous, Q12H, Darci Salgado MD, 10 mL at 08/09/21 0936  •  sodium chloride 0.9 % flush 10 mL, 10 mL, Intravenous, PRN, Darci Salgado MD, 10 mL at 08/05/21 1029  •  SUMAtriptan (IMITREX) tablet 50 mg, 50 mg, Oral, Q2H PRN,  Vernell Weinstein MD, 50 mg at 08/08/21 2329  •  traZODone (DESYREL) tablet 100 mg, 100 mg, Oral, Nightly PRN, Michael Haddad MD, 100 mg at 08/08/21 0212  •  venlafaxine XR (EFFEXOR-XR) 24 hr capsule 75 mg, 75 mg, Oral, Daily With Breakfast, John, Micheline, 75 mg at 08/09/21 0936    Facility-Administered Medications Ordered in Other Encounters:   •  acetaminophen (TYLENOL) tablet 650 mg, 650 mg, Oral, Q4H PRN, Michael Haddad MD  •  aluminum-magnesium hydroxide-simethicone (MAALOX MAX) 400-400-40 MG/5ML suspension 15 mL, 15 mL, Oral, Q6H PRN, Michael Haddad MD  •  ARIPiprazole (ABILIFY) tablet 5 mg, 5 mg, Oral, Daily, Michael Haddad MD  •  busPIRone (BUSPAR) tablet 15 mg, 15 mg, Oral, BID, Michael Haddad MD  •  carBAMazepine (TEGretol) tablet 200 mg, 200 mg, Oral, BID, Michael Haddad MD  •  clonazePAM (KlonoPIN) tablet 1 mg, 1 mg, Oral, BID, Michael Haddad MD  •  diphenhydrAMINE (BENADRYL) capsule 50 mg, 50 mg, Oral, Q4H PRN, Michael Haddad MD  •  diphenhydrAMINE (BENADRYL) injection 50 mg, 50 mg, Intramuscular, Q4H PRN, Michael Haddad MD  •  haloperidol (HALDOL) tablet 5 mg, 5 mg, Oral, Q4H PRN, Michael Haddad MD  •  haloperidol lactate (HALDOL) injection 5 mg, 5 mg, Intramuscular, Q4H PRN, Michael Haddad MD  •  hydrOXYzine pamoate (VISTARIL) capsule 50 mg, 50 mg, Oral, Q6H PRN, Michael Haddad MD  •  lamoTRIgine (LaMICtal) tablet 100 mg, 100 mg, Oral, BID, Michael Haddad MD  •  loperamide (IMODIUM) capsule 2 mg, 2 mg, Oral, Q2H PRN, Michael Haddad MD  •  LORazepam (ATIVAN) injection 2 mg, 2 mg, Intramuscular, Q4H PRN, Michael Haddad MD  •  LORazepam (ATIVAN) tablet 2 mg, 2 mg, Oral, Q4H PRN, Michael Haddad MD  •  magnesium hydroxide (MILK OF MAGNESIA) suspension 2400 mg/10mL 10 mL, 10 mL, Oral, Daily PRN, Michael Haddad MD  •  prazosin (MINIPRESS) capsule 2 mg, 2 mg, Oral, Nightly, Michael Haddad MD  •  traZODone (DESYREL) tablet 100 mg, 100 mg, Oral, Nightly PRN, Michael Haddad MD  •  venlafaxine XR (EFFEXOR-XR) 24 hr capsule 75 mg, 75 mg, Oral,  "Daily With Breakfast, Michael Haddad MD    Mental Status exam:    Appearance: alert, well appearing, and in no distress, oriented to person, place, and time, improved and cooperative and engaging.  Concentration/Focus: Gets derailed easily but has a history of ADD and exhibits classic ADD traits during interview  Behaviors: No agitation or restlessness  Speech: Normal rate and volume  Mood : \"Okay, fine\"  Affect: Euthymic, much brighter and full range today  Thought process: Goal-directed, future oriented  Thought Content: No voiced paranoia or delusions today  Memory : Intact  Associations: Normal  Cognitive function: No deficits  Alert and oriented : Sensorium intact  Suicidal Thoughts: Denies  Homicidal Thoughts: Denies  Insight/Judgement: Improving, improving    Assessment     Patient Active Problem List   Diagnosis   • Intentional overdose of drug in tablet form (CMS/HCC)   • Chronic post-traumatic stress disorder (PTSD)       Plan:     1) patient continues to show significant improvement.  She has improved insight.  She discusses her overdose in appropriate fashion.  She is future oriented goal-directed.  No acute agitation or restlessness.  Is improved from a psychiatric standpoint.  She is continued to show improvement on a daily basis and at this time feels she is having any acute danger.    2) patient may discharge per psych but does not need transferred to [facility] at this time secondary to stabilized mood and no suicidal ideation, she is future oriented goal-directed.          Electronically signed by Michael Haddad MD, 08/09/21, 12:16 PM EDT.  "

## 2021-08-09 NOTE — THERAPY TREATMENT NOTE
Acute Care - Speech Language Pathology   Swallow Treatment Note KAREN Chavez     Patient Name: Maddy Garcia  : 1980  MRN: [id number]  Today's Date: 2021               Admit Date: 2021    Visit Dx:     ICD-10-CM ICD-9-CM   1. Intentional overdose of drug in tablet form (CMS/[facility])  T50.902A 977.9   2. Substance abuse (CMS/[facility])  F19.10 305.90   3. Somnolence  R40.0 780.09   4. Oropharyngeal dysphagia  R13.12 787.22   5. Decreased activities of daily living (ADL)  Z78.9 V49.89     Patient Active Problem List   Diagnosis   • Intentional overdose of drug in tablet form (CMS/[facility])   • Chronic post-traumatic stress disorder (PTSD)     Past Medical History:   Diagnosis Date   • Anxiety    • Arthritis    • Bladder disorder    • Chest pain    • Chronic allergic rhinitis    • GERD (gastroesophageal reflux disease)    • Mood disorder (CMS/[facility])      Past Surgical History:   Procedure Laterality Date   • ABDOMINAL SURGERY     •  SECTION      HAD 3: ,,   • HYSTERECTOMY         SLP Recommendation and Plan                                                     SPEECH PATHOLOGY DYSPHAGIA TREATMENT    Subjective/Behavioral Observations: Alert and cooperative, safety watch at bedside        Day/time of Treatment: 2021, p.m. treatment        Current Diet: Full liquid, advance as tolerated        Current Strategies: To assist with the day as needed, small bites and sips        Treatment received: Treatment received for possible upgrade to regular solids.        Results of treatment: Patient tolerated thin liquids with self administration.  Tolerated approximately 120 mL without overt signs or symptoms of aspiration.  Patient also tolerated trials of regular soft and crunchy solids with self-feeding.  Tolerated approximately 50% of meals without overt signs or symptoms of aspiration.        Progress toward goals: Dysphagia goals met        Barriers to Achieving goals: N/A        Plan of care:/changes in  plan: Upgrade diet to regular solids, thin liquids.  No further speech therapy services needed at this time.                       Plan of Care Reviewed With: patient, father          EDUCATION  The patient has been educated in the following areas:   Dysphagia (Swallowing Impairment).              Time Calculation:   Time Calculation- SLP     Row Name 08/09/21 1328             Untimed Charges    36603-IC Treatment Swallow Minutes  45  -SN         Total Minutes    Untimed Charges Total Minutes  45  -SN       Total Minutes  45  -SN        User Key  (r) = Recorded By, (t) = Taken By, (c) = Cosigned By    Initials Name Provider Type    Mary Anne Guajardo SLP Speech and Language Pathologist          Therapy Charges for Today     Code Description Service Date Service Provider Modifiers Qty    [id number] HC ST TREATMENT SWALLOW 3 8/9/2021 Mary Anne Guajardo SLP GN 1               ALMA ROSA Ledesma  8/9/2021

## 2021-08-09 NOTE — PROGRESS NOTES
[facility]     Progress Note    Patient Name: Maddy Garcia  : 1980  MRN: [id number]  Primary Care Physician:  Jailene Mitchell, VILMA  Date of admission: 2021    Subjective   Subjective   Follow-up for drug overdose, altered mental status, on mechanical ventilator.    Over the past 24 hours  Continued safety sitter at bedside  Psychiatric evaluation did not recommend transfer to [facility]  More awake and conversant        Overnight, no acute events.      This morning,  Safety sitter at bedside.    Awake and answering questions  States her blood pressure was improved this morning with systolic at 111  Was up walking yesterday, states blistering on her right lateral side of foot is improved  No fever noted.        Review of Systems  General: Denied complaints  HEENT: Denied complaints  Respiratory: Denied complaints  Cardiovascular: Denied complaints  GI: Denied complaints  Skin: Right foot wound; otherwise denied complaints           Objective   Objective     Vitals:   Temp:  [97.6 °F (36.4 °C)-98.1 °F (36.7 °C)] 98.1 °F (36.7 °C)  Heart Rate:  [68-75] 69  Resp:  [16-18] 16  BP: ()/(49-87) 111/73  Physical Exam   Vital Signs Reviewed  General: WDWN, Alert, NAD.    HEENT:  PERRL, EOMI.  OP and nares clear, MMM  Chest:  good aeration, clear to auscultation bilaterally, no audible wheezes rales or rhonchi, normal symmetric chest wall rise bilaterally  CV: RRR, no MGR, pulses 2+, equal.  Abd:  Soft, NT, ND, + BS, no HSM  Extremities:  no clubbing, no cyanosis, no edema, no joint tenderness; right lower extremity with blistering wound and redness surrounding in the lateral dorsum of the foot stable  Neuro:  A&Ox3, CN grossly intact, no focal deficits.  Skin: No rashes or lesions noted       Result Review    Result Review:  I have personally reviewed the results from the time of this admission to 2021 09:05 EDT and agree with these findings:  [x]  Laboratory  [x]  Microbiology  [x]   Radiology  [x]  EKG/Telemetry   [x]  Cardiology/Vascular   []  Pathology  []  Old records  []  Other:  Most notable findings include: High serum ammonia, 90 yesterday.  EKG with normal QTC now.    Assessment/Plan   Assessment / Plan     Brief Patient Summary:  Maddy Garcia is a 41 y.o. female     Active Hospital Problems:  Active Hospital Problems    Diagnosis    • Intentional overdose of drug in tablet form (CMS/[facility])    Intentional drug overdose with Seroquel, Vyvanase  Depression, history of suicidal attempt  U tox positive for benzodiazepine, amphetamine and opiate  Hypokalemia  Altered mental status  Toxic metabolic encephalopathy     Plan:   On room air   Chest x-ray without any acute abnormalities reviewed from yesterday  Dr. Haddad  with psych evaluating.  On IV Zosyn for now.  For coverage of blistering on and right lower extremity.  Concern for cellulitis versus subcu infection/fasciitis.  Antibiotics per primary  Disposition per primary    Labs, microbiology, notes personally reviewed  Discussed with primary    We will sign off.  Please call with questions    DVT prophylaxis:  Mechanical DVT prophylaxis orders are present.    CODE STATUS:   Code Status: CPR  Medical Interventions (Level of Support Prior to Arrest): Full     Electronically signed by LINDA South, 08/09/21, 2:12 PM EDT.  Electronically signed by Drew Shoemaker MD, 08/09/21, 5:23 PM EDT.

## 2021-08-09 NOTE — CONSULTS
Nutrition Services    Patient Name:  Maddy Garcia  YOB: 1980  MRN: [id number]  Admit Date:  8/4/2021    Following for length of stay. Pt w/ % meal intake since diet has advanced. Will continue to monitor intake and follow per protocol.    Electronically signed by:  Renata Bhardwaj RD  08/09/21 09:09 EDT

## 2021-08-09 NOTE — PLAN OF CARE
Goal Outcome Evaluation:  Plan of Care Reviewed With: patient           Outcome Summary: PT STARTED ON ORAL ANTIBIOTICS. POSSIBLE D/C HOME TOMORROW

## 2021-08-10 VITALS
SYSTOLIC BLOOD PRESSURE: 131 MMHG | OXYGEN SATURATION: 100 % | HEIGHT: 67 IN | TEMPERATURE: 97.3 F | WEIGHT: 129.19 LBS | RESPIRATION RATE: 24 BRPM | DIASTOLIC BLOOD PRESSURE: 92 MMHG | BODY MASS INDEX: 20.28 KG/M2 | HEART RATE: 81 BPM

## 2021-08-10 LAB
BACTERIA SPEC AEROBE CULT: NORMAL
BACTERIA SPEC AEROBE CULT: NORMAL

## 2021-08-10 PROCEDURE — 94799 UNLISTED PULMONARY SVC/PX: CPT

## 2021-08-10 PROCEDURE — 99239 HOSP IP/OBS DSCHRG MGMT >30: CPT | Performed by: INTERNAL MEDICINE

## 2021-08-10 RX ORDER — ARIPIPRAZOLE 5 MG/1
5 TABLET ORAL DAILY
Qty: 30 TABLET | Refills: 0 | Status: SHIPPED | OUTPATIENT
Start: 2021-08-11 | End: 2021-09-10

## 2021-08-10 RX ORDER — TRAZODONE HYDROCHLORIDE 100 MG/1
100 TABLET ORAL NIGHTLY PRN
Qty: 30 TABLET | Refills: 0 | Status: SHIPPED | OUTPATIENT
Start: 2021-08-10 | End: 2021-09-09

## 2021-08-10 RX ORDER — DOXYCYCLINE 100 MG/1
100 CAPSULE ORAL EVERY 12 HOURS SCHEDULED
Qty: 8 CAPSULE | Refills: 0 | Status: SHIPPED | OUTPATIENT
Start: 2021-08-10 | End: 2021-08-14

## 2021-08-10 RX ORDER — BUSPIRONE HYDROCHLORIDE 15 MG/1
15 TABLET ORAL 2 TIMES DAILY
Start: 2021-08-10

## 2021-08-10 RX ORDER — CLONAZEPAM 1 MG/1
1 TABLET ORAL 2 TIMES DAILY
Qty: 6 TABLET | Refills: 0 | Status: SHIPPED | OUTPATIENT
Start: 2021-08-10 | End: 2021-08-13

## 2021-08-10 RX ADMIN — VENLAFAXINE HYDROCHLORIDE 75 MG: 75 CAPSULE, EXTENDED RELEASE ORAL at 09:57

## 2021-08-10 RX ADMIN — BUSPIRONE HYDROCHLORIDE 15 MG: 15 TABLET ORAL at 09:59

## 2021-08-10 RX ADMIN — FAMOTIDINE 40 MG: 20 TABLET ORAL at 09:58

## 2021-08-10 RX ADMIN — ARIPIPRAZOLE 5 MG: 5 TABLET ORAL at 09:58

## 2021-08-10 RX ADMIN — DOXYCYCLINE 100 MG: 100 CAPSULE ORAL at 09:58

## 2021-08-10 RX ADMIN — DOCUSATE SODIUM 50MG AND SENNOSIDES 8.6MG 2 TABLET: 8.6; 5 TABLET, FILM COATED ORAL at 09:59

## 2021-08-10 RX ADMIN — Medication 1 TABLET: at 09:58

## 2021-08-10 RX ADMIN — LAMOTRIGINE 100 MG: 100 TABLET ORAL at 10:27

## 2021-08-10 RX ADMIN — OXYCODONE HYDROCHLORIDE AND ACETAMINOPHEN 1 TABLET: 7.5; 325 TABLET ORAL at 01:34

## 2021-08-10 RX ADMIN — CLONAZEPAM 1 MG: 0.5 TABLET ORAL at 09:58

## 2021-08-10 RX ADMIN — CARBAMAZEPINE 200 MG: 200 TABLET ORAL at 09:58

## 2021-08-10 RX ADMIN — SODIUM CHLORIDE, PRESERVATIVE FREE 10 ML: 5 INJECTION INTRAVENOUS at 09:59

## 2021-08-10 NOTE — DISCHARGE SUMMARY
[facility]         HOSPITALIST  DISCHARGE SUMMARY    Patient Name: Maddy Garcia  : 1980  MRN: [id number]    Date of Admission: 2021  Date of Discharge:  08/10/21   Primary Care Physician: Jailene Mitchell APRN    Consultants  Psychiatry  Pulmonary critical care    Discharge diagnosis  Intentional drug overdose with prescription medications  Depression with suicide attempt  Opioid use, and vitamin use, benzodiazepine use  Hypokalemia.  Repleted  Toxic encephalopathy  Anxiety  Hyponatremia  Hyperammonemia.  Resolved  Mycoplasma IgM positive  Right foot blister  Migraine headache  Anxiety disorder    Hospital Course     Hospital Course:  Maddy Garcia is a 41 y.o. female with history of complicated psychiatric disorders, recently going through a divorce, under a great deal of stress, she tried to overdose on Seroquel, patient's father found her unresponsive, brought to the emergency room, intubated to protect her airway, critical care consulted. Extubated, concern for right foot infection and initiated on antibiotics, right foot does have significant blistering looks more consistent with trauma.  Swelling and erythema did decrease, pain almost completely resolved.  Patient was advised by psychiatry and deemed safe for discharge, psychiatric medications adjusted by Dr. Hammer.  Patient is being discharged home today into the care of her father with 5 more days of doxycycline to complete antibiotic therapy for her right foot infection.  I have counseled the patient that if she continues to have any type of tenderness or redness may need an x-ray and further evaluation.  She plans to follow-up with her PCP and psych NP soon.    DISCHARGE Follow Up Recommendations for labs and diagnostics:   Follow with PCP  Follow-up with psych nurse practitioner      Day of Discharge     Vital Signs:  Temp:  [97.3 °F (36.3 °C)-98.5 °F (36.9 °C)] 97.3 °F (36.3 °C)  Heart Rate:  [73-91] 81  Resp:   [14-24] 24  BP: (111-131)/(53-92) 131/92  Physical Exam:   Gen: NAD, WDWN  Resp: no dyspnea  CV: no LE edema  GI: Abdomen soft +bs  Psych: AOx3, anxious  MSK: Right lateral foot with decreasing erythema and swelling, bullae decreasing    Discharge Details        Discharge Medications      New Medications      Instructions Start Date   ARIPiprazole 5 MG tablet  Commonly known as: ABILIFY   5 mg, Oral, Daily   Start Date: August 11, 2021     clonazePAM 1 MG tablet  Commonly known as: KlonoPIN   1 mg, Oral, 2 Times Daily      doxycycline 100 MG capsule  Commonly known as: MONODOX   100 mg, Oral, Every 12 Hours Scheduled      traZODone 100 MG tablet  Commonly known as: DESYREL   100 mg, Oral, Nightly PRN         Changes to Medications      Instructions Start Date   busPIRone 15 MG tablet  Commonly known as: BUSPAR  What changed: when to take this   15 mg, Oral, 2 times daily         Continue These Medications      Instructions Start Date   carBAMazepine 200 MG tablet  Commonly known as: TEGretol   200 mg, Oral, 2 Times Daily      carvedilol 25 MG tablet  Commonly known as: COREG   25 mg, Oral, 2 Times Daily With Meals      chlorzoxazone 500 MG tablet  Commonly known as: PARAFON FORTE   750 mg, Oral, Nightly      desvenlafaxine 50 MG 24 hr tablet  Commonly known as: PRISTIQ   50 mg, Oral, Daily      lamoTRIgine 100 MG tablet  Commonly known as: LaMICtal   100 mg, Oral, 2 Times Daily      oxyCODONE-acetaminophen 7.5-325 MG per tablet  Commonly known as: PERCOCET   1 tablet, Oral, Every 8 Hours PRN      prazosin 1 MG capsule  Commonly known as: MINIPRESS   1 mg, Oral, Nightly         Stop These Medications    amitriptyline 100 MG tablet  Commonly known as: ELAVIL     dicyclomine 20 MG tablet  Commonly known as: BENTYL     LORazepam 2 MG tablet  Commonly known as: ATIVAN     ondansetron ODT 4 MG disintegrating tablet  Commonly known as: ZOFRAN-ODT     QUEtiapine 50 MG tablet  Commonly known as: SEROquel     Trokendi XR 50  MG capsule sustained-release 24 hr  Generic drug: Topiramate ER     Vyvanse 50 MG capsule  Generic drug: lisdexamfetamine            Discharge Disposition:  Home or Self Care    Discharge Condition: Stable    Diet:  Hospital:  Diet Order   Procedures   • Diet Regular       Discharge Activity: As tolerated  No future appointments.    Additional Instructions for the Follow-ups that You Need to Schedule     Discharge Follow-up with PCP   As directed       Currently Documented PCP:    Jailene Mitchell APRN    PCP Phone Number:    [phone]     Follow Up Details: 1 week               Pertinent  and/or Most Recent Results     PROCEDURES:   Intubation    LAB RESULTS:      Lab 08/08/21 0340 08/07/21  0338 08/06/21  0355 08/05/21  0050 08/04/21  1615   WBC 6.84 6.63 12.63* 9.01 4.51   HEMOGLOBIN 12.8 11.7* 13.3 13.8 12.4   HEMATOCRIT 37.2 33.3* 39.4 42.2 35.9   PLATELETS 175 153 167 187 187   NEUTROS ABS 4.37 4.14 9.61* 6.82 1.86   IMMATURE GRANS (ABS) 0.02 0.02 0.04 0.03 0.01   LYMPHS ABS 1.72 1.72 1.79 1.50 2.19   MONOS ABS 0.52 0.56 1.05* 0.57 0.32   EOS ABS 0.17 0.16 0.09 0.06 0.10   MCV 92.3 91.5 93.6 95.9 91.8   PROCALCITONIN  --  0.06  --   --   --    LACTATE  --   --   --  2.4*  --          Lab 08/08/21 0340 08/07/21  0338 08/06/21  0355 08/05/21  0050 08/04/21  1615   SODIUM 140 137 136 134* 135*   POTASSIUM 3.6 3.7 3.8 3.8 3.4*   CHLORIDE 107 106 106 105 105   CO2 23.9 21.8* 19.2* 18.9* 23.0   ANION GAP 9.1 9.2 10.8 10.1 7.0   BUN 3* 5* 9 10 10   CREATININE 0.63 0.62 0.72 0.90 1.07*   GLUCOSE 101* 93 89 88 90   CALCIUM 8.8 7.8* 8.0* 8.6 8.4*   MAGNESIUM 1.9 2.0 2.0 1.9  --    PHOSPHORUS 2.5 2.0* 2.7 2.5  --          Lab 08/08/21  0340 08/07/21  0338 08/06/21  0355 08/05/21  0050 08/04/21  1615   TOTAL PROTEIN  --  5.6* 6.0 6.7 5.9*   ALBUMIN 3.90 3.50 3.50 4.30 3.90   GLOBULIN  --  2.1 2.5 2.4 2.0   ALT (SGPT)  --  23 23 25 13   AST (SGOT)  --  57* 56* 55* 18   BILIRUBIN  --  0.3 0.4 0.3 0.3   ALK PHOS   --  44 51 49 42                     Brief Urine Lab Results  (Last result in the past 365 days)      Color   Clarity   Blood   Leuk Est   Nitrite   Protein   CREAT   Urine HCG        07/28/21 2115 Yellow Clear Negative Negative Negative Negative             Microbiology Results (last 10 days)     Procedure Component Value - Date/Time    Blood Culture - Blood, Arm, Left [[id number]] Collected: 08/05/21 0050    Lab Status: Final result Specimen: Blood from Arm, Left Updated: 08/10/21 0115     Blood Culture No growth at 5 days    Blood Culture - Blood, Arm, Right [[id number]] Collected: 08/05/21 0050    Lab Status: Final result Specimen: Blood from Arm, Right Updated: 08/10/21 0115     Blood Culture No growth at 5 days    Mycoplasma Pneumoniae Antibody, IgM - Blood, Arm, Left [[id number]]  (Abnormal) Collected: 08/05/21 0050    Lab Status: Final result Specimen: Blood from Arm, Left Updated: 08/05/21 0255     Mycoplasma pneumo IgM Positive    S. Pneumo Ag Urine or CSF - Urine, Urine, Clean Catch [[id number]]  (Normal) Collected: 08/05/21 0049    Lab Status: Final result Specimen: Urine, Clean Catch Updated: 08/05/21 0656     Strep Pneumo Ag Negative    Legionella Antigen, Urine - Urine, Urine, Clean Catch [[id number]]  (Normal) Collected: 08/05/21 0049    Lab Status: Final result Specimen: Urine, Clean Catch Updated: 08/05/21 0656     LEGIONELLA ANTIGEN, URINE Negative    Respiratory Culture - Sputum, ET Suction [[id number]] Collected: 08/04/21 1655    Lab Status: Final result Specimen: Sputum from ET Suction Updated: 08/06/21 1023     Respiratory Culture Light growth (2+) Normal Respiratory Maritza: NO S.aureus/MRSA or Pseudomonas aeruginosa     Gram Stain Occasional Gram positive cocci in pairs and chains      Occasional Gram negative bacilli                 Labs Pending at Discharge: None      Time spent on Discharge including face to face service: Greater than 35 minutes    Electronically signed by Marine Tinajero MD,  08/10/21, 11:06 AM EDT.

## 2021-08-10 NOTE — PLAN OF CARE
Goal Outcome Evaluation:   Patient in pleasant mood this shift, emotional at times. No significant changes.
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
## 2021-08-11 ENCOUNTER — READMISSION MANAGEMENT (OUTPATIENT)
Dept: CALL CENTER | Facility: HOSPITAL | Age: 41
End: 2021-08-11

## 2021-08-11 NOTE — SIGNIFICANT NOTE
08/10/21 1035   Coping/Psychosocial   Observed Emotional State calm;cooperative   Verbalized Emotional State happiness   Trust Relationship/Rapport emotional support provided;empathic listening provided   Family/Support Persons father   Involvement in Care at bedside;interacting with patient   Spiritual Care   Spiritual Care Visit Type initial   Spiritual Care Source  initiative   Receptivity to Spiritual Care visit welcomed   Spiritual Care Interventions supportive conversation provided   Response to Spiritual Care engaged in conversation;receptive of support;thanks expressed;visit helpful   Use of Spiritual Resources spirituality for coping, indicated strong use of   Spiritual Care Follow-Up follow-up, none required as presently assessed;other (see comments)  (Pt says she will be discharged today.)     Pt want to attend to the Islam and she feels sad for her divorce and her children (two of them they do not live with her).   Problem: PHYSICAL THERAPY ADULT  Goal: Performs mobility at highest level of function for planned discharge setting  See evaluation for individualized goals  Outcome: Progressing  Prognosis: Good  Problem List: Decreased strength, Decreased endurance, Impaired balance, Decreased mobility, Decreased safety awareness, Decreased cognition  Assessment: Pt is an 79year old female presenting with generalized weakness decreased safety awareness balance endurance and functional mobility requiring CGA to min(A) for all bed mobility transfers and ambulation  Pt requiring verbal cues for safety and direction and is unsafe to return home due to unsteadiness and increased risk for falls  Pt is in need of continued activity in PT to improve all impairments and functional deficits to maximize current LOF  Pt will require short term rehab when medically stable for discharge  Recommendation: Short-term skilled PT     PT - OK to Discharge: Yes    See flowsheet documentation for full assessment

## 2021-08-11 NOTE — OUTREACH NOTE
Prep Survey      Responses   [facility] facility patient discharged from?  [facility]   Is LACE score < 7 ?  No   Emergency Room discharge w/ pulse ox?  No   Eligibility  Not Eligible   What are the reasons patient is not eligible?  Behavioral Health [Intentional overdose]   Does the patient have one of the following disease processes/diagnoses(primary or secondary)?  Other   Prep survey completed?  Yes          Mellissa Blackwell RN